# Patient Record
Sex: FEMALE | Race: WHITE
[De-identification: names, ages, dates, MRNs, and addresses within clinical notes are randomized per-mention and may not be internally consistent; named-entity substitution may affect disease eponyms.]

---

## 2017-07-24 ENCOUNTER — HOSPITAL ENCOUNTER (INPATIENT)
Dept: HOSPITAL 41 - JD.MS | Age: 77
LOS: 8 days | Discharge: HOME | DRG: 470 | End: 2017-08-01
Attending: ORTHOPAEDIC SURGERY | Admitting: ORTHOPAEDIC SURGERY
Payer: MEDICARE

## 2017-07-24 DIAGNOSIS — M32.9: ICD-10-CM

## 2017-07-24 DIAGNOSIS — D69.6: ICD-10-CM

## 2017-07-24 DIAGNOSIS — Z88.8: ICD-10-CM

## 2017-07-24 DIAGNOSIS — Z79.899: ICD-10-CM

## 2017-07-24 DIAGNOSIS — D61.818: ICD-10-CM

## 2017-07-24 DIAGNOSIS — I10: ICD-10-CM

## 2017-07-24 DIAGNOSIS — M17.12: Primary | ICD-10-CM

## 2017-07-24 DIAGNOSIS — H35.30: ICD-10-CM

## 2017-07-24 DIAGNOSIS — Z79.52: ICD-10-CM

## 2017-07-24 PROCEDURE — C1713 ANCHOR/SCREW BN/BN,TIS/BN: HCPCS

## 2017-07-24 PROCEDURE — C1776 JOINT DEVICE (IMPLANTABLE): HCPCS

## 2017-07-31 PROCEDURE — 0SRD0J9 REPLACEMENT OF LEFT KNEE JOINT WITH SYNTHETIC SUBSTITUTE, CEMENTED, OPEN APPROACH: ICD-10-PCS | Performed by: ORTHOPAEDIC SURGERY

## 2017-07-31 RX ADMIN — OXYCODONE HYDROCHLORIDE AND ACETAMINOPHEN PRN TAB: 5; 325 TABLET ORAL at 20:25

## 2017-07-31 RX ADMIN — IODINE ONE ML: 20; 20.4; 47 LIQUID TOPICAL at 08:11

## 2017-07-31 RX ADMIN — BUPIVACAINE HYDROCHLORIDE ONE: 2.5 INJECTION, SOLUTION EPIDURAL; INFILTRATION; INTRACAUDAL; PERINEURAL at 13:09

## 2017-07-31 RX ADMIN — BUPIVACAINE HYDROCHLORIDE ONE MG: 2.5 INJECTION, SOLUTION EPIDURAL; INFILTRATION; INTRACAUDAL; PERINEURAL at 07:55

## 2017-07-31 RX ADMIN — IODINE ONE ML: 20; 20.4; 47 LIQUID TOPICAL at 07:54

## 2017-07-31 RX ADMIN — BUPIVACAINE HYDROCHLORIDE ONE MG: 2.5 INJECTION, SOLUTION EPIDURAL; INFILTRATION; INTRACAUDAL; PERINEURAL at 08:19

## 2017-07-31 NOTE — PCM.CONS
H&P History of Present Illness





- General


Date of Service: 07/31/17


Admit Problem/Dx: 


 Admission Diagnosis/Problem





Admission Diagnosis/Problem      Osteoarthritis of knee








Source of Information: Patient, Family, Old Records, Provider, RN Notes Reviewed


History Limitations: Reports: Physical Impairment





- History of Present Illness


Initial Comments - Free Text/Narative: 


This is a 76-year-old, white male, with past medical history of HTN, Hx/o Left 

Hip Fracture Status Post Surgical Pinning, Chronic ETOH Use, Chronic Tobacco 

Product Use (He chews) and Osteopenia who underwent left hip hardware removal 

and left total knee arthroplasty post operative day zero. Patient is doing 

relatively well. Currently, his pain is controlled. He denies any acute issues. 

Medicine was consulted for postoperative care.   








- Related Data


Allergies/Adverse Reactions: 


 Allergies











Allergy/AdvReac Type Severity Reaction Status Date / Time


 


ACE Inhibitors Allergy  Cannot Verified 07/31/17 06:44





   Remember  


 


amoxicillin Allergy  Cannot Verified 07/31/17 06:44





   Remember  


 


Sulfa (Sulfonamide Allergy  Cannot Verified 07/31/17 06:44





Antibiotics)   Remember  











Home Medications: 


 Home Meds





Gabapentin [Neurontin] 300 mg PO TID 10/27/16 [History]


Hydroxychloroquine [Plaquenil] 200 mg PO BID 10/27/16 [History]


Losartan/Hydrochlorothiazide [Losartan-HCTZ 50-12.5 MG] 1 tab PO BID 10/27/16 [

History]


azaTHIOprine [Azathioprine] 50 mg PO BID 10/27/16 [History]


Aspirin [Halfprin] 81 mg PO DAILY 07/28/17 [History]


Betamethasone Valerate 1 applic TP ASDIRECTED PRN 07/28/17 [History]


Biotin 5,000 mcg PO DAILY 07/28/17 [History]


Cranberry 400 mg PO BID 07/28/17 [History]


Diltiazem HCl [Diltiazem ER] 300 mg PO DAILY 07/28/17 [History]


FA/Lycopene/Lut/MV,Ca,Iron,Min [Centrum] 1 tab PO DAILY 07/28/17 [History]


Lysine 500 mg PO DAILY 07/28/17 [History]


Zolpidem Tartrate [Ambien] 5 mg PO BEDTIME PRN 07/28/17 [History]


predniSONE [Prednisone] 5 mg PO DAILY 07/28/17 [History]











Past Medical History


HEENT History: Reports: Impaired Vision, Macular Degeneration, Other (See Below)


Other HEENT History: Broken tooth


Cardiovascular History: Reports: Hypertension, Other (See Below)


Other Cardiovascular History: Dependent edema


Respiratory History: Reports: None


Gastrointestinal History: Reports: Colon Polyp


Genitourinary History: Reports: UTI, Recurrent


OB/GYN History: Reports: Pregnancy


Musculoskeletal History: Reports: Osteoarthritis


Other Musculoskeletal History: Hand pain, ankle sprain, bilateral carpal tunnel


Neurological History: Reports: Other (See Below)


Other Neuro History: Mild kyphosis


Psychiatric History: Reports: None


Endocrine/Metabolic History: Reports: Osteopenia, Other (See Below)


Other Endocrine/Metabolic History: Lupus


Hematologic History: Reports: Other (See Below)


Other Hematologic History: Thrombocytopenia, pancytopenia


Immunologic History: Reports: SLE, Other (See Below)


Other Immunologic History: Lupus


Oncologic (Cancer) History: Reports: None


Dermatologic History: Reports: Other (See Below)


Other Dermatologic History: Dry skin





- Infectious Disease History


Infectious Disease History: Reports: Shingles





- Past Surgical History


HEENT Surgical History: Reports: Cataract Surgery


Cardiovascular Surgical History: Reports: None


Respiratory Surgical History: Reports: None


GI Surgical History: Reports: Colonoscopy


Female  Surgical History: Reports: Dilitation & Evacuation


Endocrine Surgical History: Reports: None


Neurological Surgical History: Reports: None


Musculoskeletal Surgical History: Reports: Knee Replacement


Other Musculoskeletal Surgeries/Procedures:: Bilateral carpaul tunnel repair, 

Right total knee replacement


Oncologic Surgical History: Reports: None


Dermatological Surgical History: Reports: Skin Biopsy





Social & Family History





- Family History


Family Medical History: Noncontributory





- Tobacco Use


Smoking Status *Q: Never Smoker


Second Hand Smoke Exposure: No





- Caffeine Use


Caffeine Use: Reports: Coffee


Caffeine Use Comment: for breakfast





- Alcohol Use


Days Per Week of Alcohol Use: 0





- Recreational Drug Use


Recreational Drug Use: No


Drug Use in Last 12 Months: No





H&P Review of Systems





- Review of Systems:


Review Of Systems: See Below


General: Denies: Fever, Chills, Malaise, Weakness, Fatigue


HEENT: Reports: No Symptoms


Pulmonary: Denies: Shortness of Breath


Cardiovascular: Denies: Chest Pain, Palpitations, Dyspnea on Exertion, Edema, 

Lightheadedness


Gastrointestinal: Denies: Abdominal Pain, Decreased Appetite, Nausea, Vomiting


Genitourinary: Reports: No Symptoms


Musculoskeletal: Reports: No Symptoms


Skin: Denies: Cyanosis, Rash, Erythema


Psychiatric: Denies: Depression, Anxiety, Agitation, Hallucinations, Suicidal 

Ideation


Neurological: Reports: Pre-Existing Deficit, Difficulty Walking, Gait 

Disturbance.  Denies: Confusion, Weakness


Hematologic/Lymphatic: Reports: No Symptoms


Immunologic: Reports: No Symptoms





Exam





- Exam


Exam: See Below





- Vital Signs


Vital Signs: 


 Last Vital Signs











Temp  36.5 C   07/31/17 11:40


 


Pulse  86   07/31/17 11:40


 


Resp  14   07/31/17 11:40


 


BP  128/69   07/31/17 11:40


 


Pulse Ox  96   07/31/17 11:59











Weight: 59.421 kg





- Exam


General: Alert, Oriented, Cooperative, Mild Distress


HEENT: Conjunctiva Clear, EACs Clear, EOMI, Hearing Intact, Mucosa Moist & Pink

, Nares Patent, Normal Nasal Septum, Posterior Pharynx Clear, Pupils Equal, 

Pupils Reactive, TMs Clear, Other (Poor Dentition)


Neck: Supple, Trachea Midline, +2 Carotid Pulse wo Bruit


Lungs: Clear to Auscultation, Normal Respiratory Effort


Cardiovascular: Regular Rate, Regular Rhythm


GI/Abdominal Exam: Normal Bowel Sounds, Soft, Non-Tender, No Organomegaly, No 

Distention, No Abnormal Bruit, No Mass


 (Female) Exam: Other (Indwelling taylor catheter)


Rectal (Female) Exam: Deferred


Back Exam: Normal Inspection, Decreased Range of Motion


Extremities: Normal Inspection, Non-Tender, No Pedal Edema, Normal Capillary 

Refill, Other (limited due to abduction wedge)


Peripheral Pulses: 2+: Posterior Tibial (L), Posterior Tibial (R), Dorsalis 

Pedis (L), Dorsalis Pedis (R)


Skin: Warm, Dry, Intact


Neuro Extensive - Mental Status: Oriented x3, Normal Cognition, Memory Intact


Neuro Extensive - Motor, Sensory, Reflexes: CN II-XII Intact (limited but 

fairly intact), Abnormal Gait


Psychiatric: Alert, Normal Affect, Normal Mood.  No: Anxious, Agitated, 

Withdrawal Symptoms





Consult PN Assessment/Plan


POD#: 0


Procedures: 


Procedures





BONE IMAGING 3 PHASE (05/08/17)


DRAIN/INJ JOINT/BURSA W/O US (12/13/16)


DXA BONE DENSITY AXIAL (06/07/17)


EMERGENCY DEPT VISIT (10/27/16)


EMERGENCY DEPT VISIT (06/10/15)


X-RAY EXAM OF ANKLE (06/10/15)


X-RAY EXAM OF LOWER LEG (06/10/15)


X-RAY EXAM OF WRIST (10/27/16)








Problem List Initiated/Reviewed/Updated: Yes


Plan: 


Assessment:


Acute:


Post-Operative Care State


   - Stable


   - Continue to monitor for hemodynamic instability





S/p Removal of Hardware and Left Total Knee Arthroplasty


   - Stable


   - DVT and Pain Management as per primary team





Hx/o Chronic Pain From Left Hip Hardware S/p Removal and TKA  


   - Pain Management as per primary team





Chronic:   


HTN


Chronic ETOH Use, drinks 4-5 beers a day


Tobacco Product Use, he chews


Osteopenia





Plan:


He is clinically stable


Routine AM labs


Monitor for Withdrawal, no need for prophylaxis at this time


Nicotine Patch 21 mg daily


Continue home meds


PT/OT consult


IS q2 awake 





Thank you for the opportunity to participate in the management of this patient.





Requesting Provider: Dr. Renae


Date Consult Requested: 07/31/17


Reason for Consult: Post-Operative Care


Patient History Reviewed: Yes


Admission H&P Reviewed: Yes


Consult Result/Summary: Stable

## 2017-07-31 NOTE — PCM.OPNOTE
- General Post-Op/Procedure Note


Date of Surgery/Procedure: 07/31/17


Operative Procedure(s): left total knee arthroplasty


Pre Op Diagnosis: left knee osteoarthrosis


Post-Op Diagnosis: Same


Anesthesia Technique: Local, MAC, Spinal


Primary Surgeon: Claude Renae


Anesthesia Provider: Cierra Nolan


Assistant: Gale Lane


Assistant: Lexie Crawford


EBL in mLs: 300


Complications: None


Condition: Good


Free Text/Narrative:: 


 Intake & Output











 07/30/17 07/31/17 07/31/17





 22:59 06:59 14:59


 


Intake Total   400


 


Output Total   200


 


Balance   200

## 2017-07-31 NOTE — PCM.PREANE
Preanesthetic Assessment





- Anesthesia/Transfusion/Family Hx


Anesthesia History: Prior Anesthesia Without Reaction


Family History of Anesthesia Reaction: No


Transfusion History: Prior Transfusion Without Reaction





- Review of Systems


General: No Symptoms


Pulmonary: No Symptoms


Cardiovascular: No Symptoms


Gastrointestinal: No Symptoms


Neurological: No Symptoms


Other: Reports: Easy Bruising





- Physical Assessment


NPO Status Date: 07/30/17


NPO Status Time: 20:15


O2 Sat by Pulse Oximetry: 96


Respiratory Rate: 16


Vital Signs: 





 Last Vital Signs











Temp  97.7 F   07/31/17 06:10


 


Pulse  96   07/31/17 06:10


 


Resp  16   07/31/17 06:10


 


BP  153/84 H  07/31/17 06:10


 


Pulse Ox  96   07/31/17 06:10











Height: 5 ft 3 in


Weight: 59.421 kg


ASA Class: 3


Mental Status: Alert & Oriented x3


Dentition: Reports: Dentures (top ), Partial (bottom)


Thyro-Mental Finger Breadths: 2


Mouth Opening Finger Breadths: 3


ROM/Head Extension: Full


Lungs: Clear to Auscultation, Normal Respiratory Effort


Cardiovascular: Regular Rate, Regular Rhythm, No Murmurs





- Lab


Values: 





 Laboratory Last Values











MRSA (PCR)  Negative   07/19/17  12:41    








7/17/17 labs





HGB 13.8 


Plt 145


BUN 17


Cr 0.60





Na 134


K 3.9


Cl 96


COs 28





- Imaging/EKG


Impressions: 





7-17-17 EKG SR rate 74 inferior infarct-old





- Allergies


Allergies/Adverse Reactions: 


 Allergies











Allergy/AdvReac Type Severity Reaction Status Date / Time


 


ACE Inhibitors Allergy  Cannot Verified 07/31/17 06:44





   Remember  


 


amoxicillin Allergy  Cannot Verified 07/31/17 06:44





   Remember  


 


Sulfa (Sulfonamide Allergy  Cannot Verified 07/31/17 06:44





Antibiotics)   Remember  














- Blood


Blood Available: No





- Acknowledgements


Anesthesia Type Planned: Spinal


Pt an Appropriate Candidate for the Planned Anesthesia: Yes


Alternatives and Risks of Anesthesia Discussed w Pt/Guardian: Yes


Pt/Guardian Understands and Agrees with Anesthesia Plan: Yes





PreAnesthesia Questionnaire


HEENT History: Reports: Impaired Vision, Macular Degeneration, Other (See Below)


Other HEENT History: Broken tooth


Cardiovascular History: Reports: Hypertension, Other (See Below)


Other Cardiovascular History: Dependent edema


Respiratory History: Reports: None


Gastrointestinal History: Reports: Colon Polyp


Genitourinary History: Reports: UTI, Recurrent


OB/GYN History: Reports: Pregnancy


Musculoskeletal History: Reports: Osteoarthritis


Other Musculoskeletal History: Hand pain, ankle sprain, bilateral carpal tunnel


Neurological History: Reports: Other (See Below)


Other Neuro History: Mild kyphosis


Psychiatric History: Reports: None


Endocrine/Metabolic History: Reports: Osteopenia, Other (See Below)


Other Endocrine/Metabolic History: Lupus


Hematologic History: Reports: Other (See Below)


Other Hematologic History: Thrombocytopenia, pancytopenia


Immunologic History: Reports: Other (See Below)


Other Immunologic History: Lupus


Oncologic (Cancer) History: Reports: None


Dermatologic History: Reports: Other (See Below)


Other Dermatologic History: Dry skin





- Infectious Disease History


Infectious Disease History: Reports: Shingles





- Past Surgical History


HEENT Surgical History: Reports: None


Cardiovascular Surgical History: Reports: None


Respiratory Surgical History: Reports: None


GI Surgical History: Reports: Colonoscopy


Female  Surgical History: Reports: Dilitation & Evacuation


Endocrine Surgical History: Reports: None


Neurological Surgical History: Reports: None


Musculoskeletal Surgical History: Reports: Knee Replacement


Other Musculoskeletal Surgeries/Procedures:: Bilateral carpaul tunnel repair, 

Right total knee replacement


Oncologic Surgical History: Reports: None


Dermatological Surgical History: Reports: None





- SUBSTANCE USE


Smoking Status *Q: Never Smoker


Tobacco Use Within Last Twelve Months: No


Second Hand Smoke Exposure: No


Days Per Week of Alcohol Use: 0


Recreational Drug Use History: No





- HOME MEDS


Home Medications: 


 Home Meds





Gabapentin [Neurontin] 300 mg PO TID 10/27/16 [History]


Hydroxychloroquine [Plaquenil] 200 mg PO BID 10/27/16 [History]


Losartan/Hydrochlorothiazide [Losartan-HCTZ 50-12.5 MG] 1 tab PO DAILY 10/27/16 

[History]


azaTHIOprine [Azathioprine] 50 mg PO BID 10/27/16 [History]


Aspirin [Halfprin] 81 mg PO DAILY 07/28/17 [History]


Betamethasone Valerate 1 applic TP ASDIRECTED PRN 07/28/17 [History]


Biotin 5,000 mcg PO DAILY 07/28/17 [History]


Cranberry 400 mg PO BID 07/28/17 [History]


Diltiazem HCl [Diltiazem ER] 300 mg PO DAILY 07/28/17 [History]


FA/Lycopene/Lut/MV,Ca,Iron,Min [Centrum] 1 tab PO DAILY 07/28/17 [History]


Lysine 500 mg PO DAILY 07/28/17 [History]


Zolpidem Tartrate [Ambien] 5 mg PO BEDTIME PRN 07/28/17 [History]


predniSONE [Prednisone] 5 mg PO DAILY 07/28/17 [History]











- CURRENT (IN HOUSE) MEDS


Current Meds: 





 Current Medications





Lactated Ringer's (Ringers, Lactated)  1,000 mls @ 125 mls/hr IV ASDIRECTED [place]


   Last Admin: 07/31/17 06:25 Dose:  125 mls/hr


Lidocaine/Sodium Bicarbonate (Buffered Lidocaine 1% In Ns 8.4%)  0.25 ml IV 

ONETIME PRN


   PRN Reason: Prior to IV Start


   Last Admin: 07/31/17 06:25 Dose:  0.25 ml


Sodium Chloride (Saline Flush)  10 ml FLUSH ASDIRECTED PRN


   PRN Reason: Keep Vein Open





Discontinued Medications





Bupivacaine HCl (Marcaine 0.25%) Confirm Administered Dose 30 ml .ROUTE .STK-

MED ONE


   Stop: 07/31/17 06:23


Cefazolin Sodium (Ancef) Confirm Administered Dose 2 gm .ROUTE .STK-MED ONE


   Stop: 07/31/17 06:20


Cefazolin Sodium (Ancef) Confirm Administered Dose 2 gm .ROUTE .STK-MED ONE


   Stop: 07/31/17 06:47


Iodine (Iodine 2% Mild Tincture) Confirm Administered Dose 30 ml .ROUTE .STK-

MED ONE


   Stop: 07/31/17 06:20


Morphine Sulfate (Duramorph Pf) Confirm Administered Dose 10 mg .ROUTE .STK-MED 

ONE


   Stop: 07/31/17 06:47


Ondansetron HCl (Zofran) Confirm Administered Dose 4 mg .ROUTE .STK-MED ONE


   Stop: 07/31/17 06:47


Propofol (Diprivan  20 Ml) Confirm Administered Dose 200 mg .ROUTE .STK-MED ONE


   Stop: 07/31/17 06:50


Sodium Chloride (Saline Flush) Confirm Administered Dose 10 ml .ROUTE .STK-MED 

ONE


   Stop: 07/31/17 06:47


Tranexamic Acid (Cyklokapron) Confirm Administered Dose 1,000 mg .ROUTE .STK-

MED ONE


   Stop: 07/31/17 06:20

## 2017-07-31 NOTE — PCM.POSTAN
POST ANESTHESIA ASSESSMENT





- MENTAL STATUS


Mental Status: Alert, Oriented





- VITAL SIGNS


Pulse Rate: 93


SaO2: 99


Resp Rate: 9


Blood Pressure: 116/63


Temperature: 98.8 F





- RESPIRATORY


Respiratory Status: Respiratory Rate WNL, Airway Patent, O2 Saturation Stable





- CARDIOVASCULAR


CV Status: Pulse Rate WNL, Blood Pressure Stable





- GASTROINTESTINAL


GI Status: No Symptoms





- PAIN


Pain Score: 0





- POST OP HYDRATION


Hydration Status: Adequate & Stable

## 2017-07-31 NOTE — CR
Left knee: AP and lateral views of the left knee were obtained.

 

Comparison: No previous knee exam.

 

Knee prosthesis is seen.  Components are well aligned.  Soft tissue 

air is noted from the surgical procedure.  Underlying bony structures 

are intact.

 

Impression:

1.  Satisfactory radiographic appearance of a recently placed left 

knee prosthesis.

 

Diagnostic code #2

## 2017-07-31 NOTE — OR
DATE OF OPERATION:  07/31/2017

 

SURGEON:  Claude Renae MD

 

OPERATION PERFORMED:  Left total knee arthroplasty.

 

PREOPERATIVE DIAGNOSIS:

Left total knee osteoarthrosis.

 

POSTOPERATIVE DIAGNOSIS:

Left total knee osteoarthrosis.

 

ANESTHESIA:

Local MAC with spinal.

 

ANESTHESIA PROVIDER:

Cierra Nolan CRNA.

 

ASSISTANT:

Gale Lane PA-C and Lexie Crawford LPN.

 

ESTIMATED BLOOD LOSS:

300 mL.

 

COMPLICATIONS:

None.

 

CONDITION:

Stable.

 

IMPLANTS:

1. [place] size 3 PS femur.

2. [place] size 3 universal tibial baseplate.

3. Kimber[employer] size 3, 11 PS X3 polyethylene.

4. [employer] 29 x 9 mm asymmetric patella.

 

DESCRIPTION OF PROCEDURE:

The patient was identified in the preop holding area.  Proper site was marked

and identified by the surgeon.  The patient was taken back to the operating

theater.  After adequate anesthesia, the patient's left lower extremity had a

nonsterile tourniquet applied and it was then sterilely prepped and draped in

the usual sterile fashion.  OR timeout was performed.  The patient received 2 g

IV Ancef.  At this time, the left lower extremity was exsanguinated.  Tourniquet

was insufflated to 300 mmHg.  Standard medial parapatellar incision was made.

Medial parapatellar arthrotomy was created.  Deep fibers of the MCL were raised

and anterior fat pad was resected.  At this time, attention was turned to the

patella.  Patella measured 22, it was resected to a 13 for a 29 x 9 mm patella.

Drill holes were then drilled and found to be in adequate position.  The drill

was then drilled in the distal femur and the intramedullary distal femoral

cutting guide was then placed.  8 mm was resected off the distal femur and was

found to be an adequate resection.  Sizing guide was placed.  It was found to be

a size 3 PS femur that was shown on the implant record at the beginning of this

dictation.  The drill holes were drilled for the epicondylar axis using

[place] line and epicondyles as reference.  At this time, the 4-in-1 cutting

block was placed.  An anterior posterior and anterior and posterior chamfer cuts

were then completed.  The correct size box cut was then placed and the box cut

was completed and found to be an adequate resection.  Attention was turned to

the tibia.  The posterior medial lateral retractors were placed.  The

extramedullary tibial guide was placed.  It was placed in the old footprint of

the ACL.  It was aligned with the center of the ankle and 0 degrees of slope, 9

mm was then resected off the unaffected lateral side.  There was found to be an

acceptable reduction.  At this time, posterior osteophytes were removed along

with medial and lateral meniscus.  A trial implant was placed with a correct

sized tibia that was mentioned at the beginning of the dictation.  An 11 mm

trial spacer was placed and an 11 mm X3 PS polyethylene was then placed.  The

patient's knee was brought through range of motion.  The patella was tracking

centrally and was stable to varus and valgus stress.  Alignment was found to be

roughly at 0 degrees.  At this time, cement was mixed on the back table.  The

tibia was stamped and drilled in proper rotation.  All cut surfaces were

irrigated with pulse lavage irrigation with Ancef and then completely dried.

Once this was completed, then the cement was ready.  The universal tibial base

plate was cemented in place.  Next, the 3 PS femur cemented into place and the

11 mm X3 PS polyethylene was placed.  The patient's knee was brought into full

extension.  Excess cement was removed.  The patella was then cemented in place

at this time.  Tourniquet was deflated.  One liter dilute Betadine solution was

irrigated through the knee along with 3 L of pulse lavage irrigation with Ancef.

Periarticular injection was then completed.  The patient's knee was brought

through a range of motion.  Once the cement had time to set up and it was found

to be stable to varus valgus stress, the patella was tracking centrally with

full range of motion.  At this time, a #2 barbed suture was used for closure of

the medial parapatellar arthrotomy.  Topical tranexamic acid was placed.  2-0

Vicryl was used subcutaneously, a running 3-0 Monocryl was used subcuticularly.

The patient tolerated the procedure well and was sent to the PACU in stable

condition.

 

DD:  07/31/2017 12:01:34

DT:  07/31/2017 12:26:18  [facility]

Job #:  [id number]

## 2017-07-31 NOTE — PCM.CONS
H&P History of Present Illness





- General


Date of Service: 07/31/17


Admit Problem/Dx: 


 Admission Diagnosis/Problem





Admission Diagnosis/Problem      Osteoarthritis of knee








Source of Information: Patient, Old Records, Provider, RN Notes Reviewed


History Limitations: Reports: Physical Impairment





- History of Present Illness


Initial Comments - Free Text/Narative: 


This is a 76-year-old, white female, with past medical history of HTN, Lupus 

Arthritis, SLE, Thrombocytopenia, Osteopenia, Macular Degeneration, and Sprain 

of Tibio-fibular Ligament of the Ankle who underwent left hip arthroplasty post 

operative day zero. Patient is doing relatively well. Currently, her pain is 

controlled. She denies any acute issues. Medicine was consulted for 

postoperative care. 








- Related Data


Allergies/Adverse Reactions: 


 Allergies











Allergy/AdvReac Type Severity Reaction Status Date / Time


 


ACE Inhibitors Allergy  Cannot Verified 07/31/17 06:44





   Remember  


 


amoxicillin Allergy  Cannot Verified 07/31/17 06:44





   Remember  


 


Sulfa (Sulfonamide Allergy  Cannot Verified 07/31/17 06:44





Antibiotics)   Remember  











Home Medications: 


 Home Meds





Gabapentin [Neurontin] 300 mg PO TID 10/27/16 [History]


Hydroxychloroquine [Plaquenil] 200 mg PO BID 10/27/16 [History]


Losartan/Hydrochlorothiazide [Losartan-HCTZ 50-12.5 MG] 1 tab PO BID 10/27/16 [

History]


azaTHIOprine [Azathioprine] 50 mg PO BID 10/27/16 [History]


Aspirin [Halfprin] 81 mg PO DAILY 07/28/17 [History]


Betamethasone Valerate 1 applic TP ASDIRECTED PRN 07/28/17 [History]


Biotin 5,000 mcg PO DAILY 07/28/17 [History]


Cranberry 400 mg PO BID 07/28/17 [History]


Diltiazem HCl [Diltiazem ER] 300 mg PO DAILY 07/28/17 [History]


FA/Lycopene/Lut/MV,Ca,Iron,Min [Centrum] 1 tab PO DAILY 07/28/17 [History]


Lysine 500 mg PO DAILY 07/28/17 [History]


Zolpidem Tartrate [Ambien] 5 mg PO BEDTIME PRN 07/28/17 [History]


predniSONE [Prednisone] 5 mg PO DAILY 07/28/17 [History]











Past Medical History


HEENT History: Reports: Impaired Vision, Macular Degeneration, Other (See Below)


Other HEENT History: Broken tooth


Cardiovascular History: Reports: Hypertension, Other (See Below)


Other Cardiovascular History: Dependent edema


Respiratory History: Reports: None


Gastrointestinal History: Reports: Colon Polyp


Genitourinary History: Reports: UTI, Recurrent


OB/GYN History: Reports: Pregnancy


Musculoskeletal History: Reports: Osteoarthritis


Other Musculoskeletal History: Hand pain, ankle sprain, bilateral carpal tunnel


Neurological History: Reports: Other (See Below)


Other Neuro History: Mild kyphosis


Psychiatric History: Reports: None


Endocrine/Metabolic History: Reports: Osteopenia, Other (See Below)


Other Endocrine/Metabolic History: Lupus


Hematologic History: Reports: Other (See Below)


Other Hematologic History: Thrombocytopenia, pancytopenia


Immunologic History: Reports: SLE, Other (See Below)


Other Immunologic History: Lupus


Oncologic (Cancer) History: Reports: None


Dermatologic History: Reports: Other (See Below)


Other Dermatologic History: Dry skin





- Infectious Disease History


Infectious Disease History: Reports: Shingles





- Past Surgical History


HEENT Surgical History: Reports: Cataract Surgery


Cardiovascular Surgical History: Reports: None


Respiratory Surgical History: Reports: None


GI Surgical History: Reports: Colonoscopy


Female  Surgical History: Reports: Dilitation & Evacuation


Endocrine Surgical History: Reports: None


Neurological Surgical History: Reports: None


Musculoskeletal Surgical History: Reports: Knee Replacement


Other Musculoskeletal Surgeries/Procedures:: Bilateral carpaul tunnel repair, 

Right total knee replacement


Oncologic Surgical History: Reports: None


Dermatological Surgical History: Reports: Skin Biopsy





Social & Family History





- Family History


Family Medical History: Noncontributory





- Tobacco Use


Smoking Status *Q: Never Smoker


Second Hand Smoke Exposure: No





- Caffeine Use


Caffeine Use: Reports: Coffee


Caffeine Use Comment: for breakfast





- Alcohol Use


Days Per Week of Alcohol Use: 0





- Recreational Drug Use


Recreational Drug Use: No


Drug Use in Last 12 Months: No





H&P Review of Systems





- Review of Systems:


Review Of Systems: See Below


General: Denies: Fever, Chills, Malaise, Weakness


HEENT: Reports: No Symptoms


Pulmonary: Denies: Shortness of Breath


Cardiovascular: Denies: Chest Pain, Palpitations, Dyspnea on Exertion, 

Lightheadedness


Gastrointestinal: Reports: Flatus.  Denies: Abdominal Pain, Decreased Appetite, 

Difficulty Swallowing, Nausea, Vomiting


Genitourinary: Reports: No Symptoms, Other


Musculoskeletal: Denies: Neck Pain


Skin: Denies: Jaundice, Erythema, Wound


Psychiatric: Denies: Confusion, Depression, Anxiety, Hallucinations, Suicidal 

Ideation


Neurological: Reports: Pre-Existing Deficit, Difficulty Walking, Gait 

Disturbance.  Denies: Confusion, Weakness


Hematologic/Lymphatic: Reports: No Symptoms


Immunologic: Reports: No Symptoms





Exam





- Exam


Exam: See Below





- Vital Signs


Vital Signs: 


 Last Vital Signs











Temp  37.0 C   07/31/17 12:52


 


Pulse  96   07/31/17 12:52


 


Resp  16   07/31/17 12:52


 


BP  126/62   07/31/17 12:52


 


Pulse Ox  96   07/31/17 12:52











Weight: 59.421 kg





- Exam


General: Alert, Oriented, Cooperative.  No: Mild Distress


HEENT: Conjunctiva Clear, EACs Clear, EOMI, Hearing Intact, Mucosa Moist & Pink

, Nares Patent, Normal Nasal Septum, Posterior Pharynx Clear, Pupils Equal, 

Pupils Reactive


Neck: Supple, Trachea Midline, +2 Carotid Pulse wo Bruit


Lungs: Clear to Auscultation, Normal Respiratory Effort


Cardiovascular: Regular Rate, Regular Rhythm


GI/Abdominal Exam: Normal Bowel Sounds, Soft, Non-Tender, No Organomegaly, No 

Distention, No Abnormal Bruit, No Mass


 (Female) Exam: Other (indwelling taylor catheter)


Rectal (Female) Exam: Deferred


Back Exam: Normal Inspection, Decreased Range of Motion


Extremities: Normal Inspection, Normal Range of Motion, Non-Tender, No Pedal 

Edema, Normal Capillary Refill


Peripheral Pulses: 2+: Posterior Tibial (L), Posterior Tibial (R), Dorsalis 

Pedis (L), Dorsalis Pedis (R)


Neuro Extensive - Mental Status: Oriented x3, Normal Cognition, Memory Intact


Neuro Extensive - Motor, Sensory, Reflexes: CN II-XII Intact (Limited but 

fairly intact), Abnormal Gait


Psychiatric: Alert, Normal Affect, Normal Mood





Consult PN Assessment/Plan


POD#: 0


Procedures: 


Procedures





BONE IMAGING 3 PHASE (05/08/17)


DRAIN/INJ JOINT/BURSA W/O US (12/13/16)


DXA BONE DENSITY AXIAL (06/07/17)


EMERGENCY DEPT VISIT (10/27/16)


EMERGENCY DEPT VISIT (06/10/15)


X-RAY EXAM OF ANKLE (06/10/15)


X-RAY EXAM OF LOWER LEG (06/10/15)


X-RAY EXAM OF WRIST (10/27/16)








Problem List Initiated/Reviewed/Updated: Yes


My Orders Last 24 Hours: 


My Active Orders





07/31/17 16:04


LORazepam [Ativan]   2 mg IVPUSH Q4H PRN 


LORazepam [Ativan]   See Protocol  IVPUSH Q4H PRN 


Metoprolol Tartrate [Lopressor]   5 mg IVPUSH Q4H PRN 


hydrALAZINE [Apresoline]   20 mg IVPUSH Q4H PRN 





08/01/17 09:00


Nicotine [Habitrol]   21 mg TRDERM DAILY 











Plan: 


Assessment:


Acute:


Post-Operative Care State


   - Stable


   - Continue to monitor for hemodynamic instability





S/p Left Total Knee Arthroplasty


   - Stable


   - DVT and Pain Management as per primary team





Hx/o Chronic Pain Left Knee S/p TKA  


   - Pain Management as per primary team





Chronic:   


HTN


Lupus Arthritis


SLE


Thrombocytopenia


Osteopenia


Macular Degeneration


Sprain of Tibio-fibular Ligament of the Ankle 





Plan:


She is clinically stable


Routine AM labs


Continue home meds


PT/OT consult


IS q2 awake 





Thank you for the opportunity to participate in the management of this patient.





Requesting Provider: Dr. Renae


Date Consult Requested: 07/31/17


Reason for Consult: Post-Operative Care


Patient History Reviewed: Yes


Admission H&P Reviewed: Yes


Consult Result/Summary: Stable

## 2017-08-01 VITALS — SYSTOLIC BLOOD PRESSURE: 140 MMHG | DIASTOLIC BLOOD PRESSURE: 66 MMHG

## 2017-08-01 RX ADMIN — OXYCODONE HYDROCHLORIDE AND ACETAMINOPHEN PRN TAB: 5; 325 TABLET ORAL at 08:40

## 2017-08-01 RX ADMIN — OXYCODONE HYDROCHLORIDE AND ACETAMINOPHEN PRN TAB: 5; 325 TABLET ORAL at 13:19

## 2017-08-01 RX ADMIN — OXYCODONE HYDROCHLORIDE AND ACETAMINOPHEN PRN TAB: 5; 325 TABLET ORAL at 04:31

## 2017-08-01 NOTE — PCM.SURGPN
- General Info


Date of Service: 08/01/17


POD#: 1


Functional Status: Reports: Pain Controlled, Tolerating Diet, Ambulating, 

Urinating, Incentive Spirometry.  Denies: New Symptoms





- Review of Systems


General: Denies: Fever, Chills


Musculoskeletal: Reports: Other (The pt reports her pain has been controlled.)





- Patient Data


Vitals - Most Recent: 


 Last Vital Signs











Temp  98.1 F   08/01/17 08:07


 


Pulse  88   08/01/17 11:37


 


Resp  14   08/01/17 11:37


 


BP  140/66   08/01/17 11:37


 


Pulse Ox  93 L  08/01/17 11:37











Weight - Most Recent: 139 lb 3.2 oz


I&O - Last 24 Hours: 


 Intake & Output











 07/31/17 08/01/17 08/01/17





 22:59 06:59 14:59


 


Intake Total 1210 450 180


 


Output Total 525  


 


Balance 685 450 180











Lab Results Last 24 Hrs: 


 Laboratory Results - last 24 hr











  08/01/17 08/01/17 Range/Units





  05:50 05:50 


 


WBC  5.36   (3.98-10.04)  K/mm3


 


RBC  3.53 L   (3.98-5.22)  M/mm3


 


Hgb  10.9 L   (11.2-15.7)  gm/L


 


Hct  33.6 L   (34.1-44.9)  %


 


MCV  95.2 H   (79.4-94.8)  fl


 


MCH  30.9   (25.6-32.2)  pg


 


MCHC  32.4   (32.2-35.5)  g/dl


 


RDW Std Deviation  53.2 H   (36.4-46.3)  fL


 


Plt Count  136 L   (182-369)  K/mm3


 


MPV  10.9   (9.4-12.3)  fl


 


Neut % (Auto)  73.8 H   (34.0-71.1)  %


 


Lymph % (Auto)  13.6 L   (19.3-51.7)  %


 


Mono % (Auto)  11.4   (4.7-12.5)  %


 


Eos % (Auto)  0.4 L   (0.7-5.8)  


 


Baso % (Auto)  0.2   (0.1-1.2)  %


 


Neut # (Auto)  3.96   (1.56-6.13)  K/mm3


 


Lymph # (Auto)  0.73 L   (1.18-3.74)  K/mm3


 


Mono # (Auto)  0.61 H   (0.24-0.36)  K/mm3


 


Eos # (Auto)  0.02 L   (0.04-0.36)  K/mm3


 


Baso # (Auto)  0.01   (0.01-0.08)  K/mm3


 


Sodium   136  (136-145)  mEq/L


 


Potassium   3.9  (3.5-5.1)  mEq/L


 


Chloride   103  ()  mEq/L


 


Carbon Dioxide   31  (21-32)  mEq/L


 


Anion Gap   5.9  (5-15)  


 


BUN   19 H  (7-18)  mg/dL


 


Creatinine   0.8  (0.55-1.02)  mg/dL


 


Est Cr Clr Drug Dosing   48.72  mL/min


 


Estimated GFR (MDRD)   > 60  (>60)  mL/min


 


BUN/Creatinine Ratio   23.8 H  (14-18)  


 


Glucose   92  ()  mg/dL


 


Calcium   8.7  (8.5-10.1)  mg/dL


 


Total Bilirubin   0.5  (0.2-1.0)  mg/dL


 


AST   29  (15-37)  U/L


 


ALT   28  (14-59)  U/L


 


Alkaline Phosphatase   38 L  ()  U/L


 


Total Protein   5.4 L  (6.4-8.2)  g/dl


 


Albumin   2.9 L  (3.4-5.0)  g/dl


 


Globulin   2.5  gm/dL


 


Albumin/Globulin Ratio   1.2  (1-2)  











Med Orders - Current: 


 Current Medications





Aspirin (Ecotrin)  325 mg PO BID [facility]


   Last Admin: 08/01/17 08:39 Dose:  325 mg


Azathioprine (Imuran)  50 mg PO BID [facility]


   Last Admin: 08/01/17 08:40 Dose:  50 mg


Betamethasone Valerate (Valisone 0.1% Lotion)  0 ml TOP ASDIRECTED PRN


   PRN Reason: Pain


Bisacodyl (Dulcolax)  5 mg PO DAILY PRN


   PRN Reason: Constipation


Diltiazem HCl (Cardizem Cd)  300 mg PO DAILY [facility]


   Last Admin: 08/01/17 08:39 Dose:  300 mg


Diphenhydramine HCl (Benadryl)  25 mg IVPUSH Q6H PRN


   PRN Reason: Itching


   Last Admin: 07/31/17 20:43 Dose:  25 mg


Docusate Sodium (Colace)  100 mg PO BID [facility]


   Last Admin: 08/01/17 08:39 Dose:  100 mg


Famotidine (Pepcid)  20 mg PO BID [facility]


   Last Admin: 08/01/17 08:39 Dose:  20 mg


Gabapentin (Neurontin)  300 mg PO TID [facility]


   Last Admin: 08/01/17 08:39 Dose:  300 mg


Hydralazine HCl (Apresoline)  10 mg IVPUSH Q4H PRN


   PRN Reason: Hypertension


Hydrochlorothiazide (Hydrochlorothiazide)  12.5 mg PO DAILY [facility]


   Last Admin: 08/01/17 08:39 Dose:  12.5 mg


Hydroxychloroquine Sulfate (Plaquenil)  200 mg PO BID [facility]


   Last Admin: 08/01/17 08:39 Dose:  200 mg


Losartan Potassium (Cozaar)  50 mg PO DAILY [facility]


   Last Admin: 08/01/17 08:38 Dose:  50 mg


Magnesium Hydroxide (Milk Of Magnesia)  30 ml PO BID PRN


   PRN Reason: Constipation


Metoprolol Tartrate (Lopressor)  5 mg IVPUSH Q4H PRN


   PRN Reason: Tachycardia


Morphine Sulfate (Morphine)  2 mg IVPUSH Q2H PRN


   PRN Reason: Breakthrough Pain


Multivitamins (Thera)  1 each PO WITHBREAKFAST [facility]


   Last Admin: 08/01/17 06:42 Dose:  1 each


Ondansetron HCl (Zofran)  4 mg IVPUSH Q6H PRN


   PRN Reason: Nausea/Vomiting


Oxycodone/Acetaminophen (Percocet 325-5 Mg)  1 - 2 tab PO Q4H PRN


   PRN Reason: Pain


   Last Admin: 08/01/17 08:40 Dose:  2 tab


Lysine 500 Mg  0 each PO DAILY [facility]


   Last Admin: 08/01/17 08:41 Dose:  Not Given


Prednisone (Prednisone)  5 mg PO DAILY [facility]


   Last Admin: 08/01/17 08:39 Dose:  5 mg


Senna (Senna)  8.6 mg PO BID PRN


   PRN Reason: Constipation


Sodium Chloride (Saline Flush)  10 ml FLUSH ASDIRECTED PRN


   PRN Reason: Keep Vein Open





Discontinued Medications





Bupivacaine HCl (Marcaine 0.25%) Confirm Administered Dose 30 ml .ROUTE .[facility]-

MED ONE


   Stop: 07/31/17 06:23


   Last Admin: 07/31/17 08:20 Dose:  30 ml


Cefazolin Sodium (Ancef) Confirm Administered Dose 2 gm .ROUTE .[facility]-MED ONE


   Stop: 07/31/17 06:20


   Last Admin: 07/31/17 08:15 Dose:  2 gm


Cefazolin Sodium (Ancef) Confirm Administered Dose 2 gm .ROUTE .ST[facility]-MED ONE


   Stop: 07/31/17 06:47


Morphine Sulfate 8 mg/Epinephrine HCl 0.3 mg/Cefuroxime Sodium 750 mg/Ketorolac 

Tromethamine 30 mg/Sodium Chloride 27.9 ml  0 mg .XX ONETIME ONE


   Stop: 07/31/17 07:31


   Last Admin: 07/31/17 13:09 Dose:  Not Given


Diphenhydramine HCl (Benadryl)  25 mg IVPUSH Q6H PRN


   PRN Reason: pruritis


   Stop: 07/31/17 10:00


Diphenhydramine HCl (Benadryl)  25 mg IVPUSH Q6H PRN


   PRN Reason: pruritis


   Stop: 07/31/17 16:00


Diphenhydramine HCl (Benadryl)  25 mg IVPUSH Q6H PRN


   PRN Reason: pruritis


Hydralazine HCl (Apresoline)  20 mg IVPUSH Q4H PRN


   PRN Reason: Hypertension


Hydrocortisone Sodium Succinate (Solu-Cortef) Confirm Administered Dose 100 mg 

.ROUTE .STK-MED ONE


   Stop: 07/31/17 07:30


Lactated Ringer's (Ringers, Lactated)  1,000 mls @ 125 mls/hr IV ASDIRECTED [facility]


   Last Admin: 07/31/17 10:06 Dose:  125 mls/hr


Cefazolin Sodium/Dextrose 2 gm (/ Premix)  50 mls @ 100 mls/hr IV Q8H [facility]


   Stop: 08/01/17 07:29


   Last Admin: 08/01/17 06:41 Dose:  100 mls/hr


Lactated Ringer's (Ringers, Lactated) Confirm Administered Dose 1,000 mls @ as 

directed .ROUTE .STK-MED ONE


   Stop: 07/31/17 07:31


Iodine (Iodine 2% Mild Tincture) Confirm Administered Dose 30 ml .ROUTE .STK-

MED ONE


   Stop: 07/31/17 06:20


   Last Admin: 07/31/17 08:11 Dose:  18 ml


Lidocaine/Sodium Bicarbonate (Buffered Lidocaine 1% In Ns 8.4%)  0.25 ml IV 

ONETIME PRN


   PRN Reason: Prior to IV Start


   Last Admin: 07/31/17 06:25 Dose:  0.25 ml


Lorazepam (Ativan)  2 mg IVPUSH Q4H PRN


   PRN Reason: Seizures


Lorazepam (Ativan)  0 mg IVPUSH Q4H PRN; Protocol


   PRN Reason: Withdrawal Symptoms


Miscellaneous Information (Remove Patch)  1 ea TRDERM DAILY [facility]


Morphine Sulfate (Duramorph Pf) Confirm Administered Dose 10 mg .ROUTE .STK-MED 

ONE


   Stop: 07/31/17 06:47


Naloxone HCl (Narcan)  0.1 mg IVPUSH Q5M PRN


   PRN Reason: Oversedation


   Stop: 07/31/17 09:16


Nicotine (Habitrol)  21 mg TRDERM DAILY [facility]


Nicotine (Habitrol)  21 mg TRDERM ONETIME ONE


   Stop: 07/31/17 16:04


   Last Admin: 07/31/17 17:03 Dose:  Not Given


Ondansetron HCl (Zofran) Confirm Administered Dose 4 mg .ROUTE .STK-MED ONE


   Stop: 07/31/17 06:47


Ondansetron HCl (Zofran)  4 mg IVPUSH ONETIME PRN


   PRN Reason: Nausea/Vomiting


   Stop: 07/31/17 10:00


Phenylephrine HCl (Abimael-Synephrine) Confirm Administered Dose 10 mg .ROUTE .STK-

MED ONE


   Stop: 07/31/17 08:39


Propofol (Diprivan  20 Ml) Confirm Administered Dose 200 mg .ROUTE .STK-MED ONE


   Stop: 07/31/17 06:50


Propofol (Diprivan  20 Ml) Confirm Administered Dose 200 mg .ROUTE .STK-MED ONE


   Stop: 07/31/17 08:00


Sodium Chloride (Saline Flush) Confirm Administered Dose 10 ml .ROUTE .STK-MED 

ONE


   Stop: 07/31/17 06:47


Tranexamic Acid (Cyklokapron) Confirm Administered Dose 1,000 mg .ROUTE .STK-

MED ONE


   Stop: 07/31/17 06:20


   Last Admin: 07/31/17 08:26 Dose:  1,000 mg











- Exam


Wound/Incisions: Dressing Dry and Intact


General: Alert, Cooperative, No Acute Distress


Lungs: Normal Respiratory Effort


Extremities: Other (NVS intact for LLE.  Taisha's negative.  Indepdent SLR left.)





- Problem List Review


Problem List Initiated/Reviewed/Updated: Yes





- My Orders


Last 24 Hours: 


 Active Orders 24 hr











 Category Date Time Status


 


 Ready for Discharge [RC] PER UNIT ROUTINE Care  08/01/17 12:33 Active


 


 Consult to Dietician [CONS] Routine Cons  08/01/17 09:30 Active


 


 Aspirin [Ecotrin] Med  08/01/17 09:00 Active





 325 mg PO BID   


 


 Diltiazem [Cardizem CD] Med  08/01/17 09:00 Active





 300 mg PO DAILY   


 


 Gabapentin [Neurontin] Med  07/31/17 15:00 Active





 300 mg PO TID   


 


 Hydrochlorothiazide Med  08/01/17 09:00 Active





 12.5 mg PO DAILY   


 


 Hydroxychloroquine [Plaquenil] Med  07/31/17 21:00 Active





 200 mg PO BID   


 


 Losartan [Cozaar] Med  08/01/17 09:00 Active





 50 mg PO DAILY   


 


 Metoprolol Tartrate [Lopressor] Med  07/31/17 16:04 Active





 5 mg IVPUSH Q4H PRN   


 


 Multivitamins,Therapeutic [Thera] Med  08/01/17 07:00 Active





 1 each PO WITHBREAKFAST   


 


 Patient's Own Medication [Ptom] Med  08/01/17 09:00 Active





 0 each PO DAILY   


 


 azaTHIOprine [Imuran] Med  07/31/17 21:00 Active





 50 mg PO BID   


 


 diphenhydrAMINE [Benadryl] Med  07/31/17 16:51 Active





 25 mg IVPUSH Q6H PRN   


 


 hydrALAZINE [Apresoline] Med  07/31/17 16:49 Active





 10 mg IVPUSH Q4H PRN   


 


 predniSONE Med  08/01/17 09:00 Active





 5 mg PO DAILY   








 Medication Orders





Aspirin (Ecotrin)  325 mg PO BID [facility]


   Last Admin: 08/01/17 08:39  Dose: 325 mg


Azathioprine (Imuran)  50 mg PO BID [facility]


   Last Admin: 08/01/17 08:40  Dose: 50 mg


   Admin: 07/31/17 20:40  Dose: 50 mg


Betamethasone Valerate (Valisone 0.1% Lotion)  0 ml TOP ASDIRECTED PRN


   PRN Reason: Pain


Bisacodyl (Dulcolax)  5 mg PO DAILY PRN


   PRN Reason: Constipation


Diltiazem HCl (Cardizem Cd)  300 mg PO DAILY [facility]


   Last Admin: 08/01/17 08:39  Dose: 300 mg


Diphenhydramine HCl (Benadryl)  25 mg IVPUSH Q6H PRN


   PRN Reason: Itching


   Last Admin: 07/31/17 20:43  Dose: 25 mg


Docusate Sodium (Colace)  100 mg PO BID [facility]


   Last Admin: 08/01/17 08:39  Dose: 100 mg


   Admin: 07/31/17 20:25  Dose: 100 mg


   Admin: 07/31/17 15:24  Dose: 100 mg


Famotidine (Pepcid)  20 mg PO BID [facility]


   Last Admin: 08/01/17 08:39  Dose: 20 mg


   Admin: 07/31/17 20:25  Dose: 20 mg


   Admin: 07/31/17 15:24 Dose:  Not Given


Gabapentin (Neurontin)  300 mg PO TID [facility]


   Last Admin: 08/01/17 08:39  Dose: 300 mg


   Admin: 07/31/17 20:25  Dose: 300 mg


   Admin: 07/31/17 15:24  Dose: 300 mg


Hydralazine HCl (Apresoline)  10 mg IVPUSH Q4H PRN


   PRN Reason: Hypertension


Hydrochlorothiazide (Hydrochlorothiazide)  12.5 mg PO DAILY [facility]


   Last Admin: 08/01/17 08:39  Dose: 12.5 mg


Hydroxychloroquine Sulfate (Plaquenil)  200 mg PO BID [facility]


   Last Admin: 08/01/17 08:39  Dose: 200 mg


   Admin: 07/31/17 20:25  Dose: 200 mg


Losartan Potassium (Cozaar)  50 mg PO DAILY [facility]


   Last Admin: 08/01/17 08:38  Dose: 50 mg


Magnesium Hydroxide (Milk Of Magnesia)  30 ml PO BID PRN


   PRN Reason: Constipation


Metoprolol Tartrate (Lopressor)  5 mg IVPUSH Q4H PRN


   PRN Reason: Tachycardia


Morphine Sulfate (Morphine)  2 mg IVPUSH Q2H PRN


   PRN Reason: Breakthrough Pain


Multivitamins (Thera)  1 each PO WITHBREAKFAST [facility]


   Last Admin: 08/01/17 06:42  Dose: 1 each


Ondansetron HCl (Zofran)  4 mg IVPUSH Q6H PRN


   PRN Reason: Nausea/Vomiting


Oxycodone/Acetaminophen (Percocet 325-5 Mg)  1 - 2 tab PO Q4H PRN


   PRN Reason: Pain


   Last Admin: 08/01/17 08:40  Dose: 2 tab


   Admin: 08/01/17 04:31  Dose: 2 tab


   Admin: 07/31/17 20:25  Dose: 2 tab


Lysine 500 Mg  0 each PO DAILY [facility]


   Last Admin: 08/01/17 08:41  Dose:  


Prednisone (Prednisone)  5 mg PO DAILY [facility]


   Last Admin: 08/01/17 08:39  Dose: 5 mg


Senna (Senna)  8.6 mg PO BID PRN


   PRN Reason: Constipation


Sodium Chloride (Saline Flush)  10 ml FLUSH ASDIRECTED PRN


   PRN Reason: Keep Vein Open











- Assessment


Assessment           (Free Text/Narrative):: 





POD#1 - left TKA





- Plan


Plan                        (Free Text/Narrative):: 





1.  ASA 325mg BID for VTE prophylaxis.  SCDs, TEDs, frequent mobility.


2.  Discharge to home today.  Inpatient therapy goals met and the pt has been 

cleared medically.


3.  Outpatient P.T.


4.  Hgb 10.9.





The pt's case was discussed with Dr. Renae today.

## 2017-08-01 NOTE — PCM.CONSN
- General Info


Date of Service: 08/01/17


Admission Dx/Problem (Free Text): 


 Admission Diagnosis/Problem





Admission Diagnosis/Problem      Osteoarthritis of knee





POD #1 Lt TKA with Dr. Renae


Pain controlled, tolerating diet with nausea. Working with therapies, doing 

well. Plans dc home with family today. 


Functional Status: Reports: Pain Controlled, Tolerating Diet, Ambulating, 

Urinating.  Denies: New Symptoms





- Review of Systems


General: Reports: No Symptoms


HEENT: Reports: No Symptoms


Pulmonary: Reports: No Symptoms


Cardiovascular: Reports: No Symptoms


Gastrointestinal: Reports: No Symptoms


Genitourinary: Reports: No Symptoms


Musculoskeletal: Reports: Leg Pain


Skin: Reports: No Symptoms


Neurological: Reports: No Symptoms


Psychiatric: Reports: No Symptoms





- Patient Data


Vitals - Most Recent: 


 Last Vital Signs











Temp  98.1 F   08/01/17 08:07


 


Pulse  101 H  08/01/17 08:07


 


Resp  12   08/01/17 08:07


 


BP  145/65 H  08/01/17 08:38


 


Pulse Ox  97   08/01/17 08:07











Weight - Most Recent: 139 lb 3.2 oz


I&O - Last 24 Hours: 


 Intake & Output











 07/31/17 08/01/17 08/01/17





 22:59 06:59 14:59


 


Intake Total 1210 450 


 


Output Total 525  


 


Balance 685 450 











Lab Results Last 24 Hours: 


 Laboratory Results - last 24 hr











  08/01/17 08/01/17 Range/Units





  05:50 05:50 


 


WBC  5.36   (3.98-10.04)  K/mm3


 


RBC  3.53 L   (3.98-5.22)  M/mm3


 


Hgb  10.9 L   (11.2-15.7)  gm/L


 


Hct  33.6 L   (34.1-44.9)  %


 


MCV  95.2 H   (79.4-94.8)  fl


 


MCH  30.9   (25.6-32.2)  pg


 


MCHC  32.4   (32.2-35.5)  g/dl


 


RDW Std Deviation  53.2 H   (36.4-46.3)  fL


 


Plt Count  136 L   (182-369)  K/mm3


 


MPV  10.9   (9.4-12.3)  fl


 


Neut % (Auto)  73.8 H   (34.0-71.1)  %


 


Lymph % (Auto)  13.6 L   (19.3-51.7)  %


 


Mono % (Auto)  11.4   (4.7-12.5)  %


 


Eos % (Auto)  0.4 L   (0.7-5.8)  


 


Baso % (Auto)  0.2   (0.1-1.2)  %


 


Neut # (Auto)  3.96   (1.56-6.13)  K/mm3


 


Lymph # (Auto)  0.73 L   (1.18-3.74)  K/mm3


 


Mono # (Auto)  0.61 H   (0.24-0.36)  K/mm3


 


Eos # (Auto)  0.02 L   (0.04-0.36)  K/mm3


 


Baso # (Auto)  0.01   (0.01-0.08)  K/mm3


 


Sodium   136  (136-145)  mEq/L


 


Potassium   3.9  (3.5-5.1)  mEq/L


 


Chloride   103  ()  mEq/L


 


Carbon Dioxide   31  (21-32)  mEq/L


 


Anion Gap   5.9  (5-15)  


 


BUN   19 H  (7-18)  mg/dL


 


Creatinine   0.8  (0.55-1.02)  mg/dL


 


Est Cr Clr Drug Dosing   48.72  mL/min


 


Estimated GFR (MDRD)   > 60  (>60)  mL/min


 


BUN/Creatinine Ratio   23.8 H  (14-18)  


 


Glucose   92  ()  mg/dL


 


Calcium   8.7  (8.5-10.1)  mg/dL


 


Total Bilirubin   0.5  (0.2-1.0)  mg/dL


 


AST   29  (15-37)  U/L


 


ALT   28  (14-59)  U/L


 


Alkaline Phosphatase   38 L  ()  U/L


 


Total Protein   5.4 L  (6.4-8.2)  g/dl


 


Albumin   2.9 L  (3.4-5.0)  g/dl


 


Globulin   2.5  gm/dL


 


Albumin/Globulin Ratio   1.2  (1-2)  











Med Orders - Current: 


 Current Medications





Aspirin (Ecotrin)  325 mg PO BID [facility]


   Last Admin: 08/01/17 08:39 Dose:  325 mg


Azathioprine (Imuran)  50 mg PO BID [facility]


   Last Admin: 08/01/17 08:40 Dose:  50 mg


Betamethasone Valerate (Valisone 0.1% Lotion)  0 ml TOP ASDIRECTED PRN


   PRN Reason: Pain


Bisacodyl (Dulcolax)  5 mg PO DAILY PRN


   PRN Reason: Constipation


Diltiazem HCl (Cardizem Cd)  300 mg PO DAILY [facility]


   Last Admin: 08/01/17 08:39 Dose:  300 mg


Diphenhydramine HCl (Benadryl)  25 mg IVPUSH Q6H PRN


   PRN Reason: Itching


   Last Admin: 07/31/17 20:43 Dose:  25 mg


Docusate Sodium (Colace)  100 mg PO BID [facility]


   Last Admin: 08/01/17 08:39 Dose:  100 mg


Famotidine (Pepcid)  20 mg PO BID [facility]


   Last Admin: 08/01/17 08:39 Dose:  20 mg


Gabapentin (Neurontin)  300 mg PO TID [facility]


   Last Admin: 08/01/17 08:39 Dose:  300 mg


Hydralazine HCl (Apresoline)  10 mg IVPUSH Q4H PRN


   PRN Reason: Hypertension


Hydrochlorothiazide (Hydrochlorothiazide)  12.5 mg PO DAILY [facility]


   Last Admin: 08/01/17 08:39 Dose:  12.5 mg


Hydroxychloroquine Sulfate (Plaquenil)  200 mg PO BID [facility]


   Last Admin: 08/01/17 08:39 Dose:  200 mg


Losartan Potassium (Cozaar)  50 mg PO DAILY [facility]


   Last Admin: 08/01/17 08:38 Dose:  50 mg


Magnesium Hydroxide (Milk Of Magnesia)  30 ml PO BID PRN


   PRN Reason: Constipation


Metoprolol Tartrate (Lopressor)  5 mg IVPUSH Q4H PRN


   PRN Reason: Tachycardia


Morphine Sulfate (Morphine)  2 mg IVPUSH Q2H PRN


   PRN Reason: Breakthrough Pain


Multivitamins (Thera)  1 each PO WITHBREAKFAST [facility]


   Last Admin: 08/01/17 06:42 Dose:  1 each


Ondansetron HCl (Zofran)  4 mg IVPUSH Q6H PRN


   PRN Reason: Nausea/Vomiting


Oxycodone/Acetaminophen (Percocet 325-5 Mg)  1 - 2 tab PO Q4H PRN


   PRN Reason: Pain


   Last Admin: 08/01/17 08:40 Dose:  2 tab


Lysine 500 Mg  0 each PO DAILY [facility]


   Last Admin: 08/01/17 08:41 Dose:  Not Given


Prednisone (Prednisone)  5 mg PO DAILY [facility]


   Last Admin: 08/01/17 08:39 Dose:  5 mg


Senna (Senna)  8.6 mg PO BID PRN


   PRN Reason: Constipation


Sodium Chloride (Saline Flush)  10 ml FLUSH ASDIRECTED PRN


   PRN Reason: Keep Vein Open





Discontinued Medications





Bupivacaine HCl (Marcaine 0.25%) Confirm Administered Dose 30 ml .ROUTE .STK-

MED ONE


   Stop: 07/31/17 06:23


   Last Admin: 07/31/17 08:20 Dose:  30 ml


Cefazolin Sodium (Ancef) Confirm Administered Dose 2 gm .ROUTE .STK-MED ONE


   Stop: 07/31/17 06:20


   Last Admin: 07/31/17 08:15 Dose:  2 gm


Cefazolin Sodium (Ancef) Confirm Administered Dose 2 gm .ROUTE .STK-MED ONE


   Stop: 07/31/17 06:47


Morphine Sulfate 8 mg/Epinephrine HCl 0.3 mg/Cefuroxime Sodium 750 mg/Ketorolac 

Tromethamine 30 mg/Sodium Chloride 27.9 ml  0 mg .XX ONETIME ONE


   Stop: 07/31/17 07:31


   Last Admin: 07/31/17 13:09 Dose:  Not Given


Diphenhydramine HCl (Benadryl)  25 mg IVPUSH Q6H PRN


   PRN Reason: pruritis


   Stop: 07/31/17 10:00


Diphenhydramine HCl (Benadryl)  25 mg IVPUSH Q6H PRN


   PRN Reason: pruritis


   Stop: 07/31/17 16:00


Diphenhydramine HCl (Benadryl)  25 mg IVPUSH Q6H PRN


   PRN Reason: pruritis


Hydralazine HCl (Apresoline)  20 mg IVPUSH Q4H PRN


   PRN Reason: Hypertension


Hydrocortisone Sodium Succinate (Solu-Cortef) Confirm Administered Dose 100 mg 

.ROUTE .STK-MED ONE


   Stop: 07/31/17 07:30


Lactated Ringer's (Ringers, Lactated)  1,000 mls @ 125 mls/hr IV ASDIRECTED [facility]


   Last Admin: 07/31/17 10:06 Dose:  125 mls/hr


Cefazolin Sodium/Dextrose 2 gm (/ Premix)  50 mls @ 100 mls/hr IV Q8H [facility]


   Stop: 08/01/17 07:29


   Last Admin: 08/01/17 06:41 Dose:  100 mls/hr


Lactated Ringer's (Ringers, Lactated) Confirm Administered Dose 1,000 mls @ as 

directed .ROUTE .STK-MED ONE


   Stop: 07/31/17 07:31


Iodine (Iodine 2% Mild Tincture) Confirm Administered Dose 30 ml .ROUTE .STK-

MED ONE


   Stop: 07/31/17 06:20


   Last Admin: 07/31/17 08:11 Dose:  18 ml


Lidocaine/Sodium Bicarbonate (Buffered Lidocaine 1% In Ns 8.4%)  0.25 ml IV 

ONETIME PRN


   PRN Reason: Prior to IV Start


   Last Admin: 07/31/17 06:25 Dose:  0.25 ml


Lorazepam (Ativan)  2 mg IVPUSH Q4H PRN


   PRN Reason: Seizures


Lorazepam (Ativan)  0 mg IVPUSH Q4H PRN; Protocol


   PRN Reason: Withdrawal Symptoms


Miscellaneous Information (Remove Patch)  1 ea TRDERM DAILY [facility]


Morphine Sulfate (Duramorph Pf) Confirm Administered Dose 10 mg .ROUTE .STK-MED 

ONE


   Stop: 07/31/17 06:47


Naloxone HCl (Narcan)  0.1 mg IVPUSH Q5M PRN


   PRN Reason: Oversedation


   Stop: 07/31/17 09:16


Nicotine (Habitrol)  21 mg TRDERM DAILY [facility]


Nicotine (Habitrol)  21 mg TRDERM ONETIME ONE


   Stop: 07/31/17 16:04


   Last Admin: 07/31/17 17:03 Dose:  Not Given


Ondansetron HCl (Zofran) Confirm Administered Dose 4 mg .ROUTE .STK-MED ONE


   Stop: 07/31/17 06:47


Ondansetron HCl (Zofran)  4 mg IVPUSH ONETIME PRN


   PRN Reason: Nausea/Vomiting


   Stop: 07/31/17 10:00


Phenylephrine HCl (Abimael-Synephrine) Confirm Administered Dose 10 mg .ROUTE .STK-

MED ONE


   Stop: 07/31/17 08:39


Propofol (Diprivan  20 Ml) Confirm Administered Dose 200 mg .ROUTE .STK-MED ONE


   Stop: 07/31/17 06:50


Propofol (Diprivan  20 Ml) Confirm Administered Dose 200 mg .ROUTE .STK-MED ONE


   Stop: 07/31/17 08:00


Sodium Chloride (Saline Flush) Confirm Administered Dose 10 ml .ROUTE .STK-MED 

ONE


   Stop: 07/31/17 06:47


Tranexamic Acid (Cyklokapron) Confirm Administered Dose 1,000 mg .ROUTE .STK-

MED ONE


   Stop: 07/31/17 06:20


   Last Admin: 07/31/17 08:26 Dose:  1,000 mg











- Exam


Quality Assessment: DVT Prophylaxis


General: Alert, Oriented, Cooperative, No Acute Distress


HEENT: Pupils Equal, Pupils Reactive, EOMI, Mucous Membr. Moist/Pink


Neck: Supple


Lungs: Clear to Auscultation, Normal Respiratory Effort, Decreased Breath 

Sounds (bases)


Cardiovascular: Regular Rate, Regular Rhythm


GI/Abdominal Exam: Normal Bowel Sounds, Soft, Non-Tender, No Organomegaly


 (Female) Exam: Deferred


Extremities: Other (scd's, teds, ice to lt knee, dressing CDI to lt knee; CMS + 

to LE)


Peripheral Pulses: 1+: Dorsalis Pedis (L), Dorsalis Pedis (R)


Skin: Warm


Wound/Incisions: Dressing Dry and Intact


Neurological: No New Focal Deficit


Psy/Mental Status: Alert, Normal Affect, Normal Mood





Consult PN Assessment/Plan


POD#: 1


Procedures: 


Procedures





BONE IMAGING 3 PHASE (05/08/17)


DRAIN/INJ JOINT/BURSA W/O US (12/13/16)


DXA BONE DENSITY AXIAL (06/07/17)


EMERGENCY DEPT VISIT (10/27/16)


EMERGENCY DEPT VISIT (06/10/15)


X-RAY EXAM OF ANKLE (06/10/15)


X-RAY EXAM OF LOWER LEG (06/10/15)


X-RAY EXAM OF WRIST (10/27/16)








(1) S/P total knee arthroplasty


SNOMED Code(s): 2404112625056, 792373995, 1817826461698


   Code(s): Z96.659 - PRESENCE OF UNSPECIFIED ARTIFICIAL KNEE JOINT   Priority: 

High   Current Visit: Yes   


Qualifiers: 


   Laterality: left   Qualified Code(s): Z96.652 - Presence of left artificial 

knee joint   





(2) Osteoarthritis


SNOMED Code(s): 703248895


   Code(s): M19.90 - UNSPECIFIED OSTEOARTHRITIS, UNSPECIFIED SITE   Priority: 

High   Current Visit: Yes   


Qualifiers: 


   Osteoarthritis location: knee   Osteoarthritis type: primary   Laterality: 

left   Qualified Code(s): M17.12 - Unilateral primary osteoarthritis, left knee

   





(3) Lupus


SNOMED Code(s): 17333605, 758667797


   Code(s): M32.9 - SYSTEMIC LUPUS ERYTHEMATOSUS, UNSPECIFIED   Priority: 

Medium   Current Visit: No   


Qualifiers: 


   Systemic lupus erythematosus type: unspecified   Systemic lupus 

erythematosus organ involvement: unspecified   Qualified Code(s): M32.9 - 

Systemic lupus erythematosus, unspecified   





(4) HTN (hypertension)


SNOMED Code(s): 24433993


   Code(s): I10 - ESSENTIAL (PRIMARY) HYPERTENSION   Priority: Medium   Current 

Visit: No   


Qualifiers: 


   Hypertension type: essential hypertension   Qualified Code(s): I10 - 

Essential (primary) hypertension   





(5) Pancytopenia


SNOMED Code(s): 609282127


   Code(s): D61.818 - OTHER PANCYTOPENIA   Priority: Medium   Current Visit: No

   





(6) Osteopenia


SNOMED Code(s): 977870960


   Code(s): M85.80 - OTH DISRD OF BONE DENSITY AND STRUCTURE, UNSPECIFIED SITE 

  Priority: Medium   Current Visit: No   


Qualifiers: 


   Osteopenia location: unspecified   Qualified Code(s): M85.80 - Other 

specified disorders of bone density and structure, unspecified site   





(7) Macular degeneration


SNOMED Code(s): 436071811


   Code(s): H35.30 - UNSPECIFIED MACULAR DEGENERATION   Priority: Medium   

Current Visit: No   


Problem List Initiated/Reviewed/Updated: Yes


My Orders Last 24 Hours: 


My Active Orders





08/01/17 09:30


Consult to Dietician [CONS] Routine 











Plan: 





POD #1- Lt TKA, Dr. Renae


   -Pain management and DVT prohylax per Primary Team- Ortho


   -PT/OT


   -IS/RT


   -Hgb stable at 10.9





   -Low protein levels on labs today- dietician consult if not already done 

preoperatively. 





Chronic conditions


-Lupus- resume home meds today; plaquenil and prednisone


-HTN- resume home meds- stable


-Pancytopenia- stable at 136 today





Other:


GI prophylax


CM/SW for assist with DC planning- plans dc home today with family: OK for DC 

home today from Hospitalist standoint. 





Patient is Full Code Status.

## 2017-08-02 NOTE — PCM.DCSUM1
**Discharge Summary





- Hospital Course


Brief History: Saray is a 77 yo female who underwent left TKA with Dr. Renae 

on 7-.  The procedure was completed under spinal anesthesia.  The pt 

tolerated the procedure well and was admitted to the Medical-Surgical Unit.  

Medical management was provided by the Hospitalist service.  The pt's Hospital 

course was uneventful.  The pt's Hgb on POD#1 was 10.9.  On POD#1, 325mg BID 

was initiated for VTE prophylaxis.  SCDs and TEDs were also ordered.  A Mepilex 

dressing was placed at the incision site at the time of surgery and remained 

clean and dry.  The pt participated in P.T. and O.T. and progressed well.  The 

pt was allowed to WBAT.  On POD#1, the pt was deemed appropriate to discharge 

to home with her family.





- Discharge Data


Discharge Date: 08/01/17


Discharge Disposition: Home, Self-Care 01


Condition: Good





- Patient Summary/Data


Operative Procedure(s) Performed: left total knee arthroplasty


Consults: 


 Consultations





07/31/17 07:08


Consult to Case Management [CONS] Routine 


Consult to Physician [CONS] Routine 


OT Evaluation and Treatment [CONS] Routine 





07/31/17 07:13


PT Evaluation and Treatment [CONS] Routine 





08/01/17 09:30


Consult to Dietician [CONS] Routine 














- Patient Instructions


Diet: Usual Diet as Tolerated


Activity: Apply Ice, As Tolerated, Elevate Extremity, Full Weight Bearing


Driving: Do Not Drive


Showering/Bathing: May Shower


Wound/Incision Care: Keep Operative Site/Wound Site Clean and Dry, Do NOT 

Change Dressing


Notify Provider of: Fever, Increased Pain, Swelling and Redness, Drainage, 

Nausea and/or Vomiting


Other/Special Instructions: Please get up and moving around every hour while 

awake.  This helps to prevent blood clots.  Please use your walker and have 

help with mobility as needed.  Please take a 325mg ASPIRIN TWICE DAILY.  This 

also helps to prevent blood clots.  You may schedule for P.T.  Use the pain 

medication as needed.  The medication may cause drowsiness and constipation.  

Contact your primary care provider for instructions if you are constipated.  

You may use a stool softener like docusate sodium or Colace 100mg twice daily 

and/or a laxative like Miralax daily for constipation.  Wear the MAYANK hose 

during the day and you may remove these at night.  Place ice to the knee often.

  Place a towel between your skin and the blue pad.  Schedule an appointment 

with your primary care provider for 'routine post-op care'.  Call the Clinic 

with questions or concerns - [phone].





- Discharge Plan


Prescriptions/Med Rec: 


Acetaminophen/oxyCODONE [Percocet 325-5 MG] 1 - 2 tab PO Q4H PRN #60 tablet


 PRN Reason: Pain


Aspirin [Ecotrin] 325 mg PO BID #84 tab.ec


Home Medications: 


 Home Meds





Gabapentin [Neurontin] 300 mg PO TID 10/27/16 [History]


Hydroxychloroquine [Plaquenil] 200 mg PO BID 10/27/16 [History]


Losartan/Hydrochlorothiazide [Losartan-HCTZ 50-12.5 MG] 1 tab PO BID 10/27/16 [

History]


azaTHIOprine [Azathioprine] 50 mg PO BID 10/27/16 [History]


Aspirin [Halfprin] 81 mg PO DAILY 07/28/17 [History]


Betamethasone Valerate 1 applic TP ASDIRECTED PRN 07/28/17 [History]


Biotin 5,000 mcg PO DAILY 07/28/17 [History]


Cranberry 400 mg PO BID 07/28/17 [History]


Diltiazem HCl [Diltiazem ER] 300 mg PO DAILY 07/28/17 [History]


FA/Lycopene/Lut/MV,Ca,Iron,Min [Centrum] 1 tab PO DAILY 07/28/17 [History]


Lysine 500 mg PO DAILY 07/28/17 [History]


Zolpidem Tartrate [Ambien] 5 mg PO BEDTIME PRN 07/28/17 [History]


predniSONE [Prednisone] 5 mg PO DAILY 07/28/17 [History]


Acetaminophen/oxyCODONE [Percocet 325-5 MG] 1 - 2 tab PO Q4H PRN #60 tablet 08/ 01/17 [Rx]


Aspirin [Ecotrin] 325 mg PO BID #84 tab.ec 08/01/17 [Rx]








Patient Handouts:  Total Knee Replacement, Care After, Easy-to-Read, Total Knee 

Replacement, Easy-to-Read, Aspirin, ASA oral tablets


Referrals: 


Lyndsey Ding MD [Primary Care Provider] - 08/10/17 2:00 pm (Please 

follow-up with your primary care doctor, Lyndsey Ding, for a post-surgical 

follow-up appointment on Thursday, August 10th at 200pm. If this time does not 

work for you, please call and reschedule. )


Gale Lane PA-C [Physician Assistant] - 08/08/17 1:15 pm (Please follow-up 

with your previously scheduled appointments. The first one is with Dr. Renae on 

Tuesday, August 8th at 1:15pm and the next scheduled appointment is on Tuesday, August 15th at 11:15am. )





- Patient Data


Vitals - Most Recent: 


 Last Vital Signs











Temp  98.1 F   08/01/17 08:07


 


Pulse  88   08/01/17 11:37


 


Resp  14   08/01/17 11:37


 


BP  140/66   08/01/17 11:37


 


Pulse Ox  93 L  08/01/17 11:37











Weight - Most Recent: 139 lb 3.2 oz


Med Orders - Current: 


 Current Medications








Discontinued Medications





Aspirin (Ecotrin)  [facility] mg [facility] BID [facility]


   Last Admin: 08/01/17 08:39 Dose:  325 mg


Azathioprine (Imuran)  50 mg PO BID [facility]


   Last Admin: 08/01/17 08:40 Dose:  50 mg


Betamethasone Valerate (Valisone 0.1% Lotion)  0 ml TOP ASDIRECTED PRN


   PRN Reason: Pain


Bisacodyl (Dulcolax)  5 mg PO DAILY PRN


   PRN Reason: Constipation


Bupivacaine HCl (Marcaine 0.25%) Confirm Administered Dose 30 ml .ROUTE .STK-

MED ONE


   Stop: 07/31/17 06:23


   Last Admin: 07/31/17 08:20 Dose:  30 ml


Cefazolin Sodium (Ancef) Confirm Administered Dose 2 gm .ROUTE .STK-MED ONE


   Stop: 07/31/17 06:20


   Last Admin: 07/31/17 08:15 Dose:  2 gm


Cefazolin Sodium (Ancef) Confirm Administered Dose 2 gm .ROUTE .STK-MED ONE


   Stop: 07/31/17 06:47


Morphine Sulfate 8 mg/Epinephrine HCl 0.3 mg/Cefuroxime Sodium 750 mg/Ketorolac 

Tromethamine 30 mg/Sodium Chloride 27.9 ml  0 mg .XX ONETIME ONE


   Stop: 07/31/17 07:31


   Last Admin: 07/31/17 13:09 Dose:  Not Given


Diltiazem HCl (Cardizem Cd)  300 mg PO DAILY [facility]


   Last Admin: 08/01/17 08:39 Dose:  300 mg


Diphenhydramine HCl (Benadryl)  25 mg IVPUSH Q6H PRN


   PRN Reason: pruritis


   Stop: 07/31/17 10:00


Diphenhydramine HCl (Benadryl)  25 mg IVPUSH Q6H PRN


   PRN Reason: pruritis


   Stop: 07/31/17 16:00


Diphenhydramine HCl (Benadryl)  25 mg IVPUSH Q6H PRN


   PRN Reason: pruritis


Diphenhydramine HCl (Benadryl)  25 mg IVPUSH Q6H PRN


   PRN Reason: Itching


   Last Admin: 07/31/17 20:43 Dose:  25 mg


Docusate Sodium (Colace)  100 mg PO BID [facility]


   Last Admin: 08/01/17 08:39 Dose:  100 mg


Famotidine (Pepcid)  20 mg PO BID [facility]


   Last Admin: 08/01/17 08:39 Dose:  20 mg


Gabapentin (Neurontin)  300 mg PO TID [facility]


   Last Admin: 08/01/17 08:39 Dose:  300 mg


Hydralazine HCl (Apresoline)  20 mg IVPUSH Q4H PRN


   PRN Reason: Hypertension


Hydralazine HCl (Apresoline)  10 mg IVPUSH Q4H PRN


   PRN Reason: Hypertension


Hydrochlorothiazide (Hydrochlorothiazide)  12.5 mg PO DAILY [facility]


   Last Admin: 08/01/17 08:39 Dose:  12.5 mg


Hydrocortisone Sodium Succinate (Solu-Cortef) Confirm Administered Dose 100 mg 

.ROUTE .STK-MED ONE


   Stop: 07/31/17 07:30


Hydroxychloroquine Sulfate (Plaquenil)  200 mg PO BID [facility]


   Last Admin: 08/01/17 08:39 Dose:  200 mg


Lactated Ringer's (Ringers, Lactated)  1,000 mls @ 125 mls/hr IV ASDIRECTED [facility]


   Last Admin: 07/31/17 10:06 Dose:  125 mls/hr


Cefazolin Sodium/Dextrose 2 gm (/ Premix)  50 mls @ 100 mls/hr IV Q8H [facility]


   Stop: 08/01/17 07:29


   Last Admin: 08/01/17 06:41 Dose:  100 mls/hr


Lactated Ringer's (Ringers, Lactated) Confirm Administered Dose 1,000 mls @ as 

directed .ROUTE .STK-MED ONE


   Stop: 07/31/17 07:31


Iodine (Iodine 2% Mild Tincture) Confirm Administered Dose 30 ml .ROUTE .STK-

MED ONE


   Stop: 07/31/17 06:20


   Last Admin: 07/31/17 08:11 Dose:  18 ml


Lidocaine/Sodium Bicarbonate (Buffered Lidocaine 1% In Ns 8.4%)  0.25 ml IV 

ONETIME PRN


   PRN Reason: Prior to IV Start


   Last Admin: 07/31/17 06:25 Dose:  0.25 ml


Lorazepam (Ativan)  2 mg IVPUSH Q4H PRN


   PRN Reason: Seizures


Lorazepam (Ativan)  0 mg IVPUSH Q4H PRN; Protocol


   PRN Reason: Withdrawal Symptoms


Losartan Potassium (Cozaar)  50 mg PO DAILY [facility]


   Last Admin: 08/01/17 08:38 Dose:  50 mg


Magnesium Hydroxide (Milk Of Magnesia)  30 ml PO BID PRN


   PRN Reason: Constipation


Metoprolol Tartrate (Lopressor)  5 mg IVPUSH Q4H PRN


   PRN Reason: Tachycardia


Miscellaneous Information (Remove Patch)  1 ea TRDERM DAILY [facility]


Morphine Sulfate (Duramorph Pf) Confirm Administered Dose 10 mg .ROUTE .STK-MED 

ONE


   Stop: 07/31/17 06:47


Morphine Sulfate (Morphine)  2 mg IVPUSH Q2H PRN


   PRN Reason: Breakthrough Pain


Multivitamins (Thera)  1 each PO WITHBREAKFAST [facility]


   Last Admin: 08/01/17 06:42 Dose:  1 each


Naloxone HCl (Narcan)  0.1 mg IVPUSH Q5M PRN


   PRN Reason: Oversedation


   Stop: 07/31/17 09:16


Nicotine (Habitrol)  21 mg TRDERM DAILY [facility]


Nicotine (Habitrol)  21 mg TRDERM ONETIME ONE


   Stop: 07/31/17 16:04


   Last Admin: 07/31/17 17:03 Dose:  Not Given


Ondansetron HCl (Zofran) Confirm Administered Dose 4 mg .ROUTE .STK-MED ONE


   Stop: 07/31/17 06:47


Ondansetron HCl (Zofran)  4 mg IVPUSH Q6H PRN


   PRN Reason: Nausea/Vomiting


Ondansetron HCl (Zofran)  4 mg IVPUSH ONETIME PRN


   PRN Reason: Nausea/Vomiting


   Stop: 07/31/17 10:00


Oxycodone/Acetaminophen (Percocet 325-5 Mg)  1 - 2 tab PO Q4H PRN


   PRN Reason: Pain


   Last Admin: 08/01/17 13:19 Dose:  2 tab


Lysine 500 Mg  0 each PO DAILY [facility]


   Last Admin: 08/01/17 08:41 Dose:  Not Given


Phenylephrine HCl (Abimael-Synephrine) Confirm Administered Dose 10 mg .ROUTE .STK-

MED ONE


   Stop: 07/31/17 08:39


Prednisone (Prednisone)  5 mg PO DAILY [facility]


   Last Admin: 08/01/17 08:39 Dose:  5 mg


Propofol (Diprivan  20 Ml) Confirm Administered Dose 200 mg .ROUTE .STK-MED ONE


   Stop: 07/31/17 06:50


Propofol (Diprivan  20 Ml) Confirm Administered Dose 200 mg .ROUTE .STK-MED ONE


   Stop: 07/31/17 08:00


Senna (Senna)  8.6 mg PO BID PRN


   PRN Reason: Constipation


Sodium Chloride (Saline Flush)  10 ml FLUSH ASDIRECTED PRN


   PRN Reason: Keep Vein Open


Sodium Chloride (Saline Flush) Confirm Administered Dose 10 ml .ROUTE .STK-MED 

ONE


   Stop: 07/31/17 06:47


Tranexamic Acid (Cyklokapron) Confirm Administered Dose 1,000 mg .ROUTE .STK-

MED ONE


   Stop: 07/31/17 06:20


   Last Admin: 07/31/17 08:26 Dose:  1,000 mg











*Q Meaningful Use (DIS)





- VTE *Q


VTE Criteria *Q: 








- Stroke *Q


Stroke Criteria *Q: 








- AMI *Q


AMI Criteria *Q:

## 2019-02-18 ENCOUNTER — HOSPITAL ENCOUNTER (OUTPATIENT)
Dept: HOSPITAL 41 - JD.SDS | Age: 79
Discharge: HOME | End: 2019-02-18
Attending: ORTHOPAEDIC SURGERY
Payer: MEDICARE

## 2019-02-18 VITALS — SYSTOLIC BLOOD PRESSURE: 121 MMHG | DIASTOLIC BLOOD PRESSURE: 70 MMHG

## 2019-02-18 DIAGNOSIS — D69.6: ICD-10-CM

## 2019-02-18 DIAGNOSIS — Z88.2: ICD-10-CM

## 2019-02-18 DIAGNOSIS — I10: ICD-10-CM

## 2019-02-18 DIAGNOSIS — Z88.8: ICD-10-CM

## 2019-02-18 DIAGNOSIS — Z88.0: ICD-10-CM

## 2019-02-18 DIAGNOSIS — Z79.899: ICD-10-CM

## 2019-02-18 DIAGNOSIS — M75.102: Primary | ICD-10-CM

## 2019-02-18 DIAGNOSIS — M94.212: ICD-10-CM

## 2019-02-18 DIAGNOSIS — Z79.82: ICD-10-CM

## 2019-02-18 DIAGNOSIS — M32.9: ICD-10-CM

## 2019-02-18 PROCEDURE — 29827 SHO ARTHRS SRG RT8TR CUF RPR: CPT

## 2019-02-18 PROCEDURE — 64415 NJX AA&/STRD BRCH PLXS IMG: CPT

## 2019-02-18 PROCEDURE — 29823 SHO ARTHRS SRG XTNSV DBRDMT: CPT

## 2019-02-18 NOTE — PCM.POSTAN
POST ANESTHESIA ASSESSMENT





- MENTAL STATUS


Mental Status: Alert, Oriented





- VITAL SIGNS


Pulse Rate: 110


SaO2: 100


Resp Rate: 16


Blood Pressure: 120/75


Temperature: 97.6 F





- RESPIRATORY


Respiratory Status: Respiratory Rate WNL, Airway Patent, O2 Saturation Stable, 

Supplemental Oxygen





- CARDIOVASCULAR


CV Status: Pulse Rate WNL, Blood Pressure Stable





- GASTROINTESTINAL


GI Status: No Symptoms





- PAIN


Pain Score: 0





- POST OP HYDRATION


Hydration Status: Adequate & Stable

## 2019-02-18 NOTE — [id number]
Post Anesthesia Note





- EVALUATION WITHIN 48HRS OF ANESTHETIC


Vital Signs in Normal Range: Yes


Patient Participated in Evaluation: Yes


Respiratory Function Stable: Yes


Airway Patent: Yes


Cardiovascular Function Stable: Yes


Hydration Status Stable: Yes


Pain Control Satisfactory: Yes


Nausea and Vomiting Control Satisfactory: Yes


Mental Status Recovered: Yes (complains of dry throat. No shoulder pain.)


SaO2: 93


Resp Rate: 18

## 2019-02-18 NOTE — PCM.PREANE
Preanesthetic Assessment





- Procedure


Proposed Procedure: 





left shoulder video arthroscopy with rotator cuff repair





- Anesthesia/Transfusion/Family Hx


Anesthesia History: Prior Anesthesia Without Reaction


Family History of Anesthesia Reaction: No


Transfusion History: Prior Transfusion Without Reaction





- Review of Systems


General: No Symptoms


Pulmonary: No Symptoms


Cardiovascular: No Symptoms


Gastrointestinal: No Symptoms


Neurological: No Symptoms


Other: Reports: Easy Bruising





- Physical Assessment


NPO Status Date: 02/17/19


NPO Status Time: 17:00


O2 Sat by Pulse Oximetry: 98


Respiratory Rate: 12


Vital Signs: 





 Last Vital Signs











Temp  98.3 F   02/18/19 06:30


 


Pulse  88   02/18/19 06:30


 


Resp  12   02/18/19 06:30


 


BP  147/78 H  02/18/19 06:30


 


Pulse Ox  98   02/18/19 06:30











Height: 5 ft 3 in


Weight: 59.693 kg


ASA Class: 3


Mental Status: Alert & Oriented x3


Dentition: Reports: Dentures (top), Partial (bottom)


Thyro-Mental Finger Breadths: 3


Mouth Opening Finger Breadths: 3


ROM/Head Extension: Full


Lungs: Clear to Auscultation, Normal Respiratory Effort


Cardiovascular: Regular Rate, Regular Rhythm





- Allergies


Allergies/Adverse Reactions: 


 Allergies











Allergy/AdvReac Type Severity Reaction Status Date / Time


 


ACE Inhibitors Allergy  Cannot Verified 02/15/19 12:55





   Remember  


 


amoxicillin Allergy  Cannot Verified 02/15/19 12:55





   Remember  


 


Sulfa (Sulfonamide Allergy  Cannot Verified 02/15/19 12:55





Antibiotics)   Remember  














- Blood


Blood Available: No





- Acknowledgements


Anesthesia Type Planned: General Anesthesia


Pt an Appropriate Candidate for the Planned Anesthesia: Yes


Alternatives and Risks of Anesthesia Discussed w Pt/Guardian: Yes


Pt/Guardian Understands and Agrees with Anesthesia Plan: Yes





PreAnesthesia Questionnaire


HEENT History: Reports: Impaired Vision, Macular Degeneration, Other (See Below)


Other HEENT History: has glasses, dentures


Cardiovascular History: Reports: Hypertension


Other Cardiovascular History: Dependent edema


Respiratory History: Reports: None


Gastrointestinal History: Reports: Colon Polyp


Genitourinary History: Reports: None


OB/GYN History: Reports: None


Musculoskeletal History: Reports: Osteoarthritis, Other (See Below)


Other Musculoskeletal History: left shoulder pain


Neurological History: Reports: None


Other Neuro History: Mild kyphosis


Psychiatric History: Reports: None


Endocrine/Metabolic History: Reports: Osteopenia


Other Endocrine/Metabolic History: Lupus


Hematologic History: Reports: Other (See Below)


Other Hematologic History: thrombocytopenia, pancytopenia


Immunologic History: Reports: SLE


Other Immunologic History: Lupus


Oncologic (Cancer) History: Reports: None


Dermatologic History: Reports: None


Other Dermatologic History: Dry skin





- Infectious Disease History


Infectious Disease History: Reports: Shingles





- Past Surgical History


Cardiovascular Surgical History: Reports: None


Respiratory Surgical History: Reports: None


GI Surgical History: Reports: Colonoscopy


Female  Surgical History: Reports: D&C


Male  Surgical History: Reports: None


Endocrine Surgical History: Reports: None


Neurological Surgical History: Reports: None


Musculoskeletal Surgical History: Reports: Carpal Tunnel, Knee Replacement


Oncologic Surgical History: Reports: Bone Marrow Aspiration, Other (See Below)


Other Oncologic Surgeries/Procedures: lymph node biopsy


Dermatological Surgical History: Reports: None





- SUBSTANCE USE


Smoking Status *Q: Never Smoker


Tobacco Use Within Last Twelve Months: No


Second Hand Smoke Exposure: No


Days Per Week of Alcohol Use: 0


Recreational Drug Use History: No





- HOME MEDS


Home Medications: 


 Home Meds





Gabapentin [Neurontin] 300 mg PO TID 10/27/16 [History]


Hydroxychloroquine [Plaquenil] 400 mg PO Q48H 10/27/16 [History]


Losartan/Hydrochlorothiazide [Losartan-HCTZ 50-12.5 MG] 1 tab PO BID 10/27/16 [

History]


Aspirin [Halfprin] 81 mg PO DAILY 07/28/17 [History]


Betamethasone Valerate 1 applic TP ASDIRECTED PRN 07/28/17 [History]


Biotin 5,000 mcg PO DAILY 07/28/17 [History]


Cranberry 400 mg PO BID 07/28/17 [History]


FA/Lycopene/Lut/MV,Ca,Iron,Min [Centrum] 1 tab PO DAILY 07/28/17 [History]


Lysine 500 mg PO DAILY 07/28/17 [History]


Acetaminophen [Tylenol] 650 mg PO Q4H PRN 02/15/19 [History]


Diclofenac Sodium [Voltaren 1% Gel] 1 dose TOP BID PRN 02/15/19 [History]


Hydroxychloroquine [Plaquenil] 200 mg PO Q48H 02/15/19 [History]


azaTHIOprine [Imuran] 50 mg PO BID 02/15/19 [History]


dilTIAZem HCl [Diltiazem 24Hr ER] 300 mg PO DAILY 02/15/19 [History]











- CURRENT (IN HOUSE) MEDS


Current Meds: 





 Current Medications





Epinephrine HCl (Adrenalin)  3 mg .XX ONETIME ONE


   Stop: 02/18/19 07:01


Lactated Ringer's (Ringers, Lactated)  1,000 mls @ 125 mls/hr IV ASDIRECTED [place]


   Stop: 02/18/19 23:00


Lidocaine/Sodium Bicarbonate (Buffered Lidocaine 1% In Ns 8.4%)  0.25 ml IDERM 

ONETIME PRN


   PRN Reason: Prior to IV Start


   Stop: 02/18/19 18:00


Sodium Chloride (Saline Flush)  10 ml FLUSH ASDIRECTED PRN


   PRN Reason: Keep Vein Open


   Stop: 02/18/19 18:00





Discontinued Medications





Cefazolin Sodium (Ancef) Confirm Administered Dose 2 gm .ROUTE .STK-MED ONE


   Stop: 02/18/19 06:31


Epinephrine HCl (Adrenalin)  3 mg .XX ONETIME ONE


   Stop: 02/18/19 08:01


Epinephrine HCl (Adrenalin) Confirm Administered Dose 1 mg .ROUTE .STK-MED ONE


   Stop: 02/18/19 06:27


Fentanyl (Sublimaze) Confirm Administered Dose 100 mcg .ROUTE .STK-MED ONE


   Stop: 02/18/19 06:30


Fentanyl (Sublimaze) Confirm Administered Dose 250 mcg .ROUTE .STK-MED ONE


   Stop: 02/18/19 06:36


Lidocaine HCl (Xylocaine-Mpf 1%) Confirm Administered Dose 4 mls @ as directed 

.ROUTE .STK-MED ONE


   Stop: 02/18/19 06:29


Lidocaine HCl (Xylocaine-Mpf 1%) Confirm Administered Dose 4 mls @ as directed 

.ROUTE .STK-MED ONE


   Stop: 02/18/19 06:30


Midazolam HCl (Versed 1 Mg/Ml) Confirm Administered Dose 2 mg .ROUTE .STK-MED 

ONE


   Stop: 02/18/19 06:30


Ondansetron HCl (Zofran) Confirm Administered Dose 4 mg .ROUTE .STK-MED ONE


   Stop: 02/18/19 06:31


Propofol (Diprivan  20 Ml) Confirm Administered Dose 400 mg .ROUTE .STK-MED ONE


   Stop: 02/18/19 06:30


Rocuronium Bromide (Zemuron) Confirm Administered Dose 50 mg .ROUTE .STK-MED ONE


   Stop: 02/18/19 06:31


Ropivacaine (Naropin 0.5%) Confirm Administered Dose 30 ml .ROUTE .STK-MED ONE


   Stop: 02/18/19 06:27

## 2019-02-18 NOTE — PCM.SN
- Free Text/Narrative


Note: 





02/18/2019 [id number] Time out performed. Requested to place left interscale 

block with ultrasound guidance and nerve stimulator for post op pain control 

per Dr. Renae and patient. 





Preop diagnosis left shoulder pain.





 Procedure is left shoulder arthrosocopy. 


 Informed consent obtained. Monitors and O2 placed at 2 l per n/c. Versed 1 mg 

and Fentanyl 50 mcg given IV total. Patient awake and talking during procedure. 

Left neck and clavicle area prepped with chlorprep times 2. Sterile gloves, hat 

and mask worn. US probe with sterile sleeve placed midclavicular with ID of 

brachial plexus and subclavian artery. Brachial plexus followed cephalad to 

level of cricoid. Lidocaine 1% local anesthetic injected prior to block 

placement. 22 g 2 inch stimplex needle advanced with US guidance to brachial 

plexus. Positive forearm response at .3mA with nerve stimulator. Ceased with 

saline injection. Ropivacaine 0.5% with epi 1:200,000 injected in increments of 

5 ml with negative aspiration before each injection to a total of 30 ml.  Good 

spread of local anesthetic seen on US. Patient tolerated procedure well. Vitals 

stable with no complaints.

## 2019-02-21 NOTE — PCM.OPNOTE
- General Post-Op/Procedure Note


Date of Surgery/Procedure: 02/18/19


Operative Procedure(s): left shoulder video arthroscopy with extensive 

debridement


Pre Op Diagnosis: left should rotator cuff tear


Post-Op Diagnosis: same with grade 3/4 chondromalacia


Anesthesia Technique: General ET Tube, Regional Block


Primary Surgeon: Claude Renae


Anesthesia Provider: Cierra Nolan


Assistant: Lexie Crawford


EBL in mLs: 5


Complications: None


Condition: Good

## 2019-02-21 NOTE — OR
DATE OF OPERATION:  02/18/2019

 

SURGEON:  Claude eRnae MD

 

OPERATION PERFORMED:  Left shoulder video arthroscopy with extensive

debridement.

 

PREOPERATIVE DIAGNOSIS:

Left shoulder rotator cuff tear.

 

POSTOPERATIVE DIAGNOSIS:

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

glenoid and humeral head.

 

ANESTHESIA:

General endotracheal intubation with regional interscalene block.

 

ANESTHESIA PROVIDER:

Cierra Nolan CRNA.

 

ASSISTANT:

Lexie Crawford LPN.

 

ESTIMATED BLOOD LOSS:

Less than 5 mL.

 

COMPLICATIONS:

None.

 

CONDITION:

Stable.

 

DESCRIPTION OF PROCEDURE:

The patient was identified in the preoperative holding area.  Proper site was

marked and identified by the surgeon.  The patient was taken back to the

operating theater, where after adequate anesthesia, the patient was placed in a

lazy right lateral decubitus position.  Wedge was placed posteriorly.  The

patient was secured to the table.  All bony prominences were well padded.  Left

upper extremity was then sterilely prepped and draped in the usual sterile

fashion.  OR time-out was performed.  The patient received 2 g of IV Ancef.  A

10 pounds of traction was applied to the left upper extremity.  At this time,

posterior incision was made.  Scope trocar was introduced to the glenohumeral

joint.  With the use of a spinal needle, anterior portal was then also created.

The patient was noted to have significant grade 3/4 chondromalacia of both the

glenoid and humerus at this time, as well as an extensive full-thickness rotator

cuff tear both at the superior and anterior portion of the infraspinatus.  At

this time, I did debride significant synovitis as well as any loose cartilage

pieces.  At this time, it was decided that we would not do a rotator cuff repair

as the patient is in need of a reverse total shoulder arthroplasty.  Excess

saline was drained from the shoulder.  A 3-0 nylon suture was used for closure

of skin.  The patient was placed in a sling and sent to the PACU in stable

condition.

 

DD:  02/21/2019 10:15:31

DT:  02/21/2019 10:27:55  MMODAL

Job #:  [id number]

## 2019-04-01 ENCOUNTER — HOSPITAL ENCOUNTER (INPATIENT)
Dept: HOSPITAL 41 - JD.OB | Age: 79
LOS: 1 days | Discharge: HOME | DRG: 483 | End: 2019-04-02
Attending: ORTHOPAEDIC SURGERY | Admitting: ORTHOPAEDIC SURGERY
Payer: MEDICARE

## 2019-04-01 DIAGNOSIS — M85.89: ICD-10-CM

## 2019-04-01 DIAGNOSIS — I10: ICD-10-CM

## 2019-04-01 DIAGNOSIS — Z79.82: ICD-10-CM

## 2019-04-01 DIAGNOSIS — Z86.010: ICD-10-CM

## 2019-04-01 DIAGNOSIS — M54.5: ICD-10-CM

## 2019-04-01 DIAGNOSIS — G89.18: ICD-10-CM

## 2019-04-01 DIAGNOSIS — Z96.659: ICD-10-CM

## 2019-04-01 DIAGNOSIS — I73.00: ICD-10-CM

## 2019-04-01 DIAGNOSIS — D61.818: ICD-10-CM

## 2019-04-01 DIAGNOSIS — H35.30: ICD-10-CM

## 2019-04-01 DIAGNOSIS — Z87.440: ICD-10-CM

## 2019-04-01 DIAGNOSIS — M32.9: ICD-10-CM

## 2019-04-01 DIAGNOSIS — Z88.2: ICD-10-CM

## 2019-04-01 DIAGNOSIS — Z88.8: ICD-10-CM

## 2019-04-01 DIAGNOSIS — H54.7: ICD-10-CM

## 2019-04-01 DIAGNOSIS — M75.102: ICD-10-CM

## 2019-04-01 DIAGNOSIS — Z88.1: ICD-10-CM

## 2019-04-01 DIAGNOSIS — G89.29: ICD-10-CM

## 2019-04-01 DIAGNOSIS — M19.012: Primary | ICD-10-CM

## 2019-04-01 DIAGNOSIS — Z79.899: ICD-10-CM

## 2019-04-01 PROCEDURE — 3E0T3BZ INTRODUCTION OF ANESTHETIC AGENT INTO PERIPHERAL NERVES AND PLEXI, PERCUTANEOUS APPROACH: ICD-10-PCS

## 2019-04-01 PROCEDURE — 0RRK00Z REPLACEMENT OF LEFT SHOULDER JOINT WITH REVERSE BALL AND SOCKET SYNTHETIC SUBSTITUTE, OPEN APPROACH: ICD-10-PCS | Performed by: ORTHOPAEDIC SURGERY

## 2019-04-01 RX ADMIN — KETOROLAC TROMETHAMINE PRN MG: 15 INJECTION, SOLUTION INTRAMUSCULAR; INTRAVENOUS at 20:52

## 2019-04-01 NOTE — PCM.CONS
H&P History of Present Illness





- General


Date of Service: 04/01/19


Admit Problem/Dx: 


 Admission Diagnosis/Problem





Admission Diagnosis/Problem      Osteoarthritis of shoulder








Source of Information: Patient, Old Records, Provider, RN, RN Notes Reviewed


History Limitations: Reports: No Limitations





- History of Present Illness


Initial Comments - Free Text/Narative: 


Saray Gastelum is a 79 yo female patient of Dr. Renae who is post-operative day 

0 of reverse total shoulder arthroplasty. Hospital medicine was consulted for 

post-operative medical care. At this time she is resting comfortably in bed. 

Pain is controlled. She denies any chest pain, shortness of breath, palpitations

, nausea, or vomiting. She carries a history of: Pancytopenia, Discord Lupus, 

Raynauds disease, chronic lower back pain, microhematuria, osteopenia, HTN, 

thrombocytopenia. She was never a smoker.





She is a full code. Her primary care provider is Dr. Ding.





  ** Left Shoulder


Pain Score (Numeric/FACES): 0





- Related Data


Allergies/Adverse Reactions: 


 Allergies











Allergy/AdvReac Type Severity Reaction Status Date / Time


 


ACE Inhibitors Allergy  Cannot Verified 02/15/19 12:55





   Remember  


 


amoxicillin Allergy  Cannot Verified 02/15/19 12:55





   Remember  


 


Sulfa (Sulfonamide Allergy  Cannot Verified 02/15/19 12:55





Antibiotics)   Remember  











Home Medications: 


 Home Meds





Gabapentin [Neurontin] 300 mg PO TID 10/27/16 [History]


Hydroxychloroquine [Plaquenil] 400 mg PO Q48H 10/27/16 [History]


Losartan/Hydrochlorothiazide [Losartan-HCTZ 50-12.5 MG] 1 tab PO BID 10/27/16 [

History]


Aspirin [Halfprin] 81 mg PO DAILY 07/28/17 [History]


Betamethasone Valerate 1 applic TP ASDIRECTED PRN 07/28/17 [History]


Biotin 5,000 mcg PO DAILY 07/28/17 [History]


Cranberry 400 mg PO BID 07/28/17 [History]


FA/Lycopene/Lut/MV,Ca,Iron,Min [Centrum] 1 tab PO DAILY 07/28/17 [History]


Lysine 500 mg PO DAILY 07/28/17 [History]


Acetaminophen [Tylenol] 650 mg PO Q4H PRN 02/15/19 [History]


Diclofenac Sodium [Voltaren 1% Gel] 1 dose TOP BID PRN 02/15/19 [History]


Hydroxychloroquine [Plaquenil] 200 mg PO Q48H 02/15/19 [History]


azaTHIOprine [Imuran] 50 mg PO BID 02/15/19 [History]


dilTIAZem HCl [Diltiazem 24Hr ER] 300 mg PO DAILY 02/15/19 [History]











Past Medical History


HEENT History: Reports: Impaired Vision, Macular Degeneration, Other (See Below)


Other HEENT History: has glasses, dentures


Cardiovascular History: Reports: Hypertension


Other Cardiovascular History: Dependent edema


Respiratory History: Reports: None


Gastrointestinal History: Reports: Colon Polyp


Genitourinary History: Reports: None


OB/GYN History: Reports: None


Musculoskeletal History: Reports: Osteoarthritis, Other (See Below)


Other Musculoskeletal History: left shoulder pain


Neurological History: Reports: None


Other Neuro History: Mild kyphosis


Psychiatric History: Reports: None


Endocrine/Metabolic History: Reports: Osteopenia


Other Endocrine/Metabolic History: Lupus


Hematologic History: Reports: Other (See Below)


Other Hematologic History: thrombocytopenia, pancytopenia


Immunologic History: Reports: SLE


Other Immunologic History: Lupus


Oncologic (Cancer) History: Reports: None


Dermatologic History: Reports: None


Other Dermatologic History: Dry skin





- Infectious Disease History


Infectious Disease History: Reports: Shingles





- Past Surgical History


Cardiovascular Surgical History: Reports: None


Respiratory Surgical History: Reports: None


GI Surgical History: Reports: Colonoscopy


Female  Surgical History: Reports: D&C


Male  Surgical History: Reports: None


Endocrine Surgical History: Reports: None


Neurological Surgical History: Reports: None


Musculoskeletal Surgical History: Reports: Carpal Tunnel, Knee Replacement


Oncologic Surgical History: Reports: Bone Marrow Aspiration, Other (See Below)


Other Oncologic Surgeries/Procedures: lymph node biopsy


Dermatological Surgical History: Reports: None





Social & Family History





- Family History


Family Medical History: Noncontributory





- Tobacco Use


Smoking Status *Q: Never Smoker


Second Hand Smoke Exposure: No





- Caffeine Use


Caffeine Use: Reports: Coffee


Caffeine Use Comment: for breakfast





- Recreational Drug Use


Recreational Drug Use: No





H&P Review of Systems





- Review of Systems:


Review Of Systems: See Below


General: Reports: No Symptoms.  Denies: Fever, Chills, Malaise, Fatigue


HEENT: Reports: No Symptoms.  Denies: Headaches, Sore Throat


Pulmonary: Reports: No Symptoms.  Denies: Shortness of Breath, Wheezing, 

Pleuritic Chest Pain, Cough, Sputum


Cardiovascular: Reports: No Symptoms.  Denies: Chest Pain, Palpitations, 

Dyspnea on Exertion, Edema


Gastrointestinal: Reports: No Symptoms.  Denies: Abdominal Pain, Constipation, 

Diarrhea, Nausea, Vomiting


Genitourinary: Reports: No Symptoms.  Denies: Pain


Musculoskeletal: Reports: Shoulder Pain


Skin: Reports: No Symptoms.  Denies: Cyanosis


Psychiatric: Reports: No Symptoms.  Denies: Confusion


Neurological: Reports: No Symptoms


Hematologic/Lymphatic: Reports: No Symptoms


Immunologic: Reports: No Symptoms





Exam





- Exam


Exam: See Below





- Vital Signs


Vital Signs: 


 Last Vital Signs











Temp  97.9 F   04/01/19 06:30


 


Pulse  91   04/01/19 06:30


 


Resp  16   04/01/19 06:30


 


BP  146/79 H  04/01/19 06:30


 


Pulse Ox  99   04/01/19 06:30











Weight: 130 lb





- Exam


Quality Assessment: DVT Prophylaxis


General: Alert, Oriented, Cooperative.  No: Mild Distress


HEENT: Conjunctiva Clear, EACs Clear, EOMI, Hearing Intact, Mucosa Moist & Pink

, Normal Nasal Septum, PERRLA


Neck: Supple, Trachea Midline


Lungs: Clear to Auscultation, Normal Respiratory Effort


Cardiovascular: Regular Rate, Regular Rhythm


GI/Abdominal Exam: Normal Bowel Sounds, Soft, Non-Tender, No Organomegaly, No 

Distention


 (Female) Exam: Deferred


Rectal (Female) Exam: Deferred


Back Exam: Normal Inspection, Full Range of Motion


Extremities: No Pedal Edema, Normal Capillary Refill, Arm Pain, Limited Range 

of Motion, Other (Bandage in place on left shoulder. Bandage is dry and intact. 

Cooling pack in place. )


Peripheral Pulses: 2+: Radial (L), Radial (R), Dorsalis Pedis (L), Dorsalis 

Pedis (R)


Skin: Warm, Dry, Intact


Neurological: Cranial Nerves Intact (grossly )


Neuro Extensive - Mental Status: Alert, Oriented x3, Normal Mood/Affect, Normal 

Cognition





Consult PN Assessment/Plan


POD#: 0


Procedures: 


Procedures





ARTHROSCOP ROTATOR CUFF REPR (02/18/19)


BONE IMAGING 3 PHASE (05/08/17)


COMPLETE CBC W/AUTO DIFF WBC (07/31/17)


COMPREHEN METABOLIC PANEL (07/31/17)


DRAIN/INJ JOINT/BURSA W/O US (12/13/16)


DXA BONE DENSITY AXIAL (06/07/17)


EMERGENCY DEPT VISIT (10/27/16)


EMERGENCY DEPT VISIT (06/10/15)


GAIT TRAINING THERAPY (07/31/17)


MEASURE BLOOD OXYGEN LEVEL (07/31/17)


MR-STAPH DNA AMP PROBE (02/06/19)


MRI JOINT UPR EXTREM W/O DYE (12/20/18)


N BLOCK INJ BRACHIAL PLEXUS (02/18/19)


OT EVAL LOW COMPLEX 30 MIN (07/31/17)


PT EVAL MOD COMPLEX 30 MIN (07/31/17)


ROUTINE VENIPUNCTURE (07/31/17)


SELF CARE MNGMENT TRAINING (07/31/17)


SHOULDER ARTHROSCOPY/SURGERY (02/18/19)


THERAPEUTIC ACTIVITIES (07/31/17)


THERAPEUTIC EXERCISES (07/31/17)


X-RAY EXAM OF ANKLE (06/10/15)


X-RAY EXAM OF KNEE 1 OR 2 (07/31/17)


X-RAY EXAM OF LOWER LEG (06/10/15)


X-RAY EXAM OF WRIST (10/27/16)








(1) S/p reverse total shoulder arthroplasty


SNOMED Code(s): 012751465, 615979476


   Code(s): Z96.619 - PRESENCE OF UNSPECIFIED ARTIFICIAL SHOULDER JOINT   

Priority: High   Current Visit: Yes   


Qualifiers: 


   Laterality: left   Qualified Code(s): Z96.612 - Presence of left artificial 

shoulder joint   





(2) Raynauds disease


SNOMED Code(s): 803506529


   Code(s): I73.00 - RAYNAUD'S SYNDROME WITHOUT GANGRENE   Priority: Low   

Current Visit: No   


Qualifiers: 


   Raynaud?s-associated gangrene presence: without gangrene   Qualified Code(s)

: I73.00 - Raynaud's syndrome without gangrene   





(3) Chronic lower back pain


SNOMED Code(s): 941175525


   Code(s): M54.5 - LOW BACK PAIN; G89.29 - OTHER CHRONIC PAIN   Priority: Low 

  Current Visit: No   


Qualifiers: 


   Back pain laterality: unspecified   Sciatica presence: unspecified whether 

sciatica present   Qualified Code(s): M54.5 - Low back pain; G89.29 - Other 

chronic pain   





(4) HTN (hypertension)


SNOMED Code(s): 47570446


   Code(s): I10 - ESSENTIAL (PRIMARY) HYPERTENSION   Priority: Low   Current 

Visit: No   


Qualifiers: 


   Hypertension type: essential hypertension   Qualified Code(s): I10 - 

Essential (primary) hypertension   





(5) Lupus


SNOMED Code(s): 72183581


   Code(s): M32.9 - SYSTEMIC LUPUS ERYTHEMATOSUS, UNSPECIFIED   Priority: 

Medium   Current Visit: No   


Qualifiers: 


   Systemic lupus erythematosus type: unspecified   Systemic lupus 

erythematosus organ involvement: unspecified   Qualified Code(s): M32.9 - 

Systemic lupus erythematosus, unspecified   





(6) Macular degeneration


SNOMED Code(s): 589446517


   Code(s): H35.30 - UNSPECIFIED MACULAR DEGENERATION   Priority: Medium   

Current Visit: No   





(7) Osteoarthritis


SNOMED Code(s): 228282967


   Code(s): M19.90 - UNSPECIFIED OSTEOARTHRITIS, UNSPECIFIED SITE   Priority: 

High   Current Visit: Yes   


Qualifiers: 


   Osteoarthritis location: shoulder   Osteoarthritis type: primary   Laterality

: left   Qualified Code(s): M19.012 - Primary osteoarthritis, left shoulder   





(8) Osteopenia


SNOMED Code(s): 188491993


   Code(s): M85.80 - OTH DISRD OF BONE DENSITY AND STRUCTURE, UNSPECIFIED SITE 

  Priority: Medium   Current Visit: No   


Qualifiers: 


   Osteopenia location: unspecified   Qualified Code(s): M85.80 - Other 

specified disorders of bone density and structure, unspecified site   





(9) Pancytopenia


SNOMED Code(s): 295254189


   Code(s): D61.818 - OTHER PANCYTOPENIA   Priority: Medium   Current Visit: No

   


Problem List Initiated/Reviewed/Updated: Yes


Plan: 


I/P:





Acute:





S/P left reverse total shoulder arthroplasty - post-operative day 0


   -DVT prophylaxis and pain management per primary care team


   -PT/OT 


   -IS/RT


   -Monitor oxygen saturation


   -Titrate oxygen as needed


   -Vital signs stable 


   -Monitor labs


      -Pre-operative Hgb was 13.9


      -Pre-operative GFR was >90


      -Pre-operative BUN was 16


      -Pre-operative creatinine was 0.5   


      -Pre-operative platelet count was 150


      -Pre-operative sodium of 134





Osteoarthritis of left shoulder


   -Pain management per primary care team





Chronic:





Pancytopenia


Discord lupus


Raynauds


Chronic lower back pain


microhematuria


osteopenia


HTN


Thrombocytopenia





Plan:


CM for discharge planning


GI prophylaxis 


Home medications as indicated


Other orders as listed above 


Routine AM labs





She is a full code. Her PCP is Dr. Ding





Thank you for allowing us to participate in the care of this patient!! 








Requesting Provider: Dr. Renae 


Date Consult Requested: 04/01/19


Reason for Consult: Post-opeartive medical management 


Patient History Reviewed: Yes


Admission H&P Reviewed: Yes


Time Spent (in minutes): 40

## 2019-04-01 NOTE — CR
Left shoulder:  Two  fluoroscopic spot views were obtained of the left

 shoulder.  Study obtained utilizing C-arm device.

 

Study is a procedural exam showing placement of reverse left shoulder 

prosthesis.  No gross bony abnormality is appreciated.  Fluoroscopy 

time is given as 1.7 seconds.

 

Impression:

1.  Reverse left shoulder prosthesis.

 

Diagnostic code #1

## 2019-04-01 NOTE — PCM.SN
- Free Text/Narrative


Note: 





Anesthesia Note: (Left Interscalene Block Note)


Date: 04/01/2019


Time Out: 0741


Start: 0741


Stop: 0806





Surgical Procedure:  Left Reverse Total Shoulder Arthroplasty


Diagnosis Left shoulder osteoarthritis


Current Procedure: Left interscalene block under US guidance for postoperative 

pain control requested by Dr. Renae.     


Patient chart reviewed, risk/benefits discussed with patient, consent obtained.


Patient positioned supine, monitors/alarms on, oxygen placed via nasal cannula 

at 2 LPM.





IV sedation administered:


 Fentanyl 50mcg IV given pre-procedure.


Left shoulder prepped with two chloropreps. Sterile drapes placed with aseptic 

technique noted.  Under US guidance, left subclavian artery visualized along 

with the left brachial plexus.  Plexus followed up to C6 cricoid level, and 

area localized with 2mls of 1% lidocaine.  





22gauge 2 inch stimiplex needle advanced under US with 0.6mV with stimulation 

of biceps noted. Good stimulation noted with decreased voltage and absent at 

0.2mVs.  1ml of Normal Saline injected with loss of stimulation noted to 

confirm needle not placed intraneurally.





Incremental dosing of 5mls with negative aspiration noted prior to each 

injection of 0.5% ropivacaine with 1:200,000 epinephrine. Total volume=30mls.





Please refer to nurses noted for vital signs.


Johanne Baxter CRNA

## 2019-04-01 NOTE — CR
Left shoulder: Single AP view of the left shoulder was obtained.

 

Comparison: Previous MRI left shoulder study of 12/20/18.

 

Left shoulder prosthesis is seen.  Components are aligned.  Underlying

 bony structures are intact.

 

Impression:

1.  Satisfactory appearance of recently placed left shoulder 

prosthesis.

 

Diagnostic code #2

## 2019-04-01 NOTE — PCM.POSTAN
POST ANESTHESIA ASSESSMENT





- MENTAL STATUS


Mental Status: Alert





- VITAL SIGNS


Pulse Rate: 73


SaO2: 98


Resp Rate: 12


Blood Pressure: 104/65


Temperature: 36.6 C





- RESPIRATORY


Respiratory Status: Respiratory Rate WNL, Airway Patent, O2 Saturation Stable





- CARDIOVASCULAR


CV Status: Pulse Rate WNL, Blood Pressure Stable





- GASTROINTESTINAL


GI Status: No Symptoms





- PAIN


Pain Score: 0





- POST OP HYDRATION


Hydration Status: Adequate & Stable

## 2019-04-01 NOTE — PCM.PREANE
Preanesthetic Assessment





- Procedure


Proposed Procedure: 





Left Shoulder Reverse Total Arthroplasty 





- Anesthesia/Transfusion/Family Hx


Anesthesia History: Prior Anesthesia Without Reaction


Family History of Anesthesia Reaction: No


Transfusion History: No Prior Transfusion(s)





- Review of Systems


General: No Symptoms


Pulmonary: No Symptoms


Cardiovascular: No Symptoms


Gastrointestinal: No Symptoms


Neurological: No Symptoms


Other: Reports: None





- Physical Assessment


NPO Status Date: 03/31/19


NPO Status Time: 20:30


O2 Sat by Pulse Oximetry: 99


Respiratory Rate: 16


Vital Signs: 





 Last Vital Signs











Temp  36.6 C   04/01/19 06:30


 


Pulse  91   04/01/19 06:30


 


Resp  16   04/01/19 06:30


 


BP  146/79 H  04/01/19 06:30


 


Pulse Ox  99   04/01/19 06:30











Height: 1.6 m


Weight: 58.967 kg


ASA Class: 2


Mental Status: Alert & Oriented x3


Airway Class: Mallampati = 1


Dentition: Reports: Dentures (upper), Partial (lower )


Thyro-Mental Finger Breadths: 3


Mouth Opening Finger Breadths: 4


ROM/Head Extension: Full


Lungs: Clear to Auscultation, Normal Respiratory Effort


Cardiovascular: Regular Rate, Regular Rhythm





- Lab


Values: 





 Laboratory Last Values











MRSA (PCR)  Negative   03/18/19  09:37    














- Allergies


Allergies/Adverse Reactions: 


 Allergies











Allergy/AdvReac Type Severity Reaction Status Date / Time


 


ACE Inhibitors Allergy  Cannot Verified 02/15/19 12:55





   Remember  


 


amoxicillin Allergy  Cannot Verified 02/15/19 12:55





   Remember  


 


Sulfa (Sulfonamide Allergy  Cannot Verified 02/15/19 12:55





Antibiotics)   Remember  














- Blood


Blood Available: No





- Acknowledgements


Anesthesia Type Planned: General Anesthesia, Regional Block


Pt an Appropriate Candidate for the Planned Anesthesia: Yes


Alternatives and Risks of Anesthesia Discussed w Pt/Guardian: Yes


Pt/Guardian Understands and Agrees with Anesthesia Plan: Yes





PreAnesthesia Questionnaire


HEENT History: Reports: Impaired Vision, Macular Degeneration, Other (See Below)


Other HEENT History: has glasses, dentures


Cardiovascular History: Reports: Hypertension


Other Cardiovascular History: Dependent edema


Respiratory History: Reports: None


Gastrointestinal History: Reports: Colon Polyp


Genitourinary History: Reports: None


OB/GYN History: Reports: None


Musculoskeletal History: Reports: Osteoarthritis, Other (See Below)


Other Musculoskeletal History: left shoulder pain


Neurological History: Reports: None


Other Neuro History: Mild kyphosis


Psychiatric History: Reports: None


Endocrine/Metabolic History: Reports: Osteopenia


Other Endocrine/Metabolic History: Lupus


Hematologic History: Reports: Other (See Below)


Other Hematologic History: thrombocytopenia, pancytopenia


Immunologic History: Reports: SLE


Other Immunologic History: Lupus


Oncologic (Cancer) History: Reports: None


Dermatologic History: Reports: None


Other Dermatologic History: Dry skin





- Infectious Disease History


Infectious Disease History: Reports: Shingles





- Past Surgical History


Cardiovascular Surgical History: Reports: None


Respiratory Surgical History: Reports: None


GI Surgical History: Reports: Colonoscopy


Female  Surgical History: Reports: D&C


Male  Surgical History: Reports: None


Endocrine Surgical History: Reports: None


Neurological Surgical History: Reports: None


Musculoskeletal Surgical History: Reports: Carpal Tunnel, Knee Replacement


Oncologic Surgical History: Reports: Bone Marrow Aspiration, Other (See Below)


Other Oncologic Surgeries/Procedures: lymph node biopsy


Dermatological Surgical History: Reports: None





- SUBSTANCE USE


Smoking Status *Q: Never Smoker


Second Hand Smoke Exposure: No


Recreational Drug Use History: No





- HOME MEDS


Home Medications: 


 Home Meds





Gabapentin [Neurontin] 300 mg PO TID 10/27/16 [History]


Hydroxychloroquine [Plaquenil] 400 mg PO Q48H 10/27/16 [History]


Losartan/Hydrochlorothiazide [Losartan-HCTZ 50-12.5 MG] 1 tab PO BID 10/27/16 [

History]


Aspirin [Halfprin] 81 mg PO DAILY 07/28/17 [History]


Betamethasone Valerate 1 applic TP ASDIRECTED PRN 07/28/17 [History]


Biotin 5,000 mcg PO DAILY 07/28/17 [History]


Cranberry 400 mg PO BID 07/28/17 [History]


FA/Lycopene/Lut/MV,Ca,Iron,Min [Centrum] 1 tab PO DAILY 07/28/17 [History]


Lysine 500 mg PO DAILY 07/28/17 [History]


Acetaminophen [Tylenol] 650 mg PO Q4H PRN 02/15/19 [History]


Diclofenac Sodium [Voltaren 1% Gel] 1 dose TOP BID PRN 02/15/19 [History]


Hydroxychloroquine [Plaquenil] 200 mg PO Q48H 02/15/19 [History]


azaTHIOprine [Imuran] 50 mg PO BID 02/15/19 [History]


dilTIAZem HCl [Diltiazem 24Hr ER] 300 mg PO DAILY 02/15/19 [History]











- CURRENT (IN HOUSE) MEDS


Current Meds: 





 Current Medications





Hydrocodone Bitart/Acetaminophen (Norco 325-5 Mg)  1 - 2 tab PO Q4H PRN


   PRN Reason: Pain


Aspirin (Ecotrin)  325 mg PO DAILY [facility]


Bisacodyl (Dulcolax)  5 mg PO DAILY PRN


   PRN Reason: Constipation


Cyclobenzaprine HCl (Flexeril)  5 mg PO BID PRN


   PRN Reason: Spasms


Famotidine (Pepcid)  20 mg PO Q12H [facility]


Lactated Ringer's (Ringers, Lactated)  1,000 mls @ 125 mls/hr IV ASDIRECTED [facility]


   Last Admin: 04/01/19 06:50 Dose:  125 mls/hr


Cefazolin Sodium/Dextrose 2 gm (/ Premix)  50 mls @ 100 mls/hr IV Q8H [facility]


   Stop: 04/02/19 07:29


Ketorolac Tromethamine (Toradol)  15 mg IVPUSH Q6H PRN


   PRN Reason: Pain


Lidocaine/Sodium Bicarbonate (Buffered Lidocaine 1% In Ns 8.4%)  0.25 ml IDERM 

ONETIME PRN


   PRN Reason: Prior to IV Start


   Last Admin: 04/01/19 06:50 Dose:  0.25 ml


Magnesium Hydroxide (Milk Of Magnesia)  30 ml PO BID PRN


   PRN Reason: Constipation


Morphine Sulfate (Morphine)  2 mg IVPUSH Q2H PRN


   PRN Reason: Breakthrough Pain


Naloxone HCl (Narcan)  0.1 mg IVPUSH Q5M PRN


   PRN Reason: Oversedation


Ondansetron HCl (Zofran)  4 mg IVPUSH Q6H PRN


   PRN Reason: Nausea/Vomiting


Senna (Senna)  8.6 mg PO BID PRN


   PRN Reason: Constipation


Sodium Chloride (Saline Flush)  10 ml FLUSH ASDIRECTED PRN


   PRN Reason: Keep Vein Open





Discontinued Medications





Acetaminophen (Tylenol)  975 mg PO ONETIME ONE


   Stop: 04/01/19 06:01


   Last Admin: 04/01/19 06:44 Dose:  975 mg


Cefazolin Sodium (Ancef) Confirm Administered Dose 2 gm .ROUTE .STK-MED ONE


   Stop: 04/01/19 06:55


Fentanyl (Sublimaze) Confirm Administered Dose 250 mcg .ROUTE .STK-MED ONE


   Stop: 04/01/19 07:28


Iodine (Iodine 2% Mild Tincture) Confirm Administered Dose 30 ml .ROUTE .STK-

MED ONE


   Stop: 04/01/19 06:55


Midazolam HCl (Versed 1 Mg/Ml) Confirm Administered Dose 2 mg .ROUTE .STK-MED 

ONE


   Stop: 04/01/19 07:28


Oxycodone HCl (Oxycontin)  10 mg PO ONETIME ONE


   Stop: 04/01/19 06:01


   Last Admin: 04/01/19 06:43 Dose:  10 mg


Pregabalin (Lyrica)  50 mg PO ONETIME ONE


   Stop: 04/01/19 06:01


   Last Admin: 04/01/19 06:43 Dose:  50 mg


Tranexamic Acid (Cyklokapron) Confirm Administered Dose 1,000 mg .ROUTE .STK-

MED ONE


   Stop: 04/01/19 06:54


Tranexamic Acid (Cyklokapron) Confirm Administered Dose 1,000 mg .ROUTE .ST-

MED ONE


   Stop: 04/01/19 07:17


Tranexamic Acid (Cyklokapron) Confirm Administered Dose 1,000 mg .ROUTE .STK-

MED ONE


   Stop: 04/01/19 07:20


Vancomycin HCl (Vancomycin) Confirm Administered Dose 1 gm .ROUTE .STK-MED ONE


   Stop: 04/01/19 06:55

## 2019-04-02 VITALS — SYSTOLIC BLOOD PRESSURE: 104 MMHG | DIASTOLIC BLOOD PRESSURE: 57 MMHG

## 2019-04-02 RX ADMIN — HYDROCODONE BITARTRATE AND ACETAMINOPHEN PRN TAB: 5; 325 TABLET ORAL at 11:24

## 2019-04-02 RX ADMIN — KETOROLAC TROMETHAMINE PRN MG: 15 INJECTION, SOLUTION INTRAMUSCULAR; INTRAVENOUS at 03:05

## 2019-04-02 RX ADMIN — HYDROCODONE BITARTRATE AND ACETAMINOPHEN PRN TAB: 5; 325 TABLET ORAL at 06:02

## 2019-04-02 NOTE — PCM.DCSUM1
**Discharge Summary





- Hospital Course


Brief History: Saray is a 77 yo female who underwent left reverse TSA with 

Dr. Renae on 4-1-2019.  The procedure was completed under general anesthesia 

with regional block.  The pt tolerated the procedure well and was admitted to 

the Ob/Gyn Unit under Medical-Surgical status.  Medical management was provided 

by the Hospitalist service.  The pt's Hospital course was uneventful.  The pt's 

Hgb on POD#1 was 10.9.  On POD#1, 325mg ASA daily was initiated for VTE 

prophylaxis.  SCDs and TEDs were also ordered.  A Mepilex dressing was placed 

at the incision site at the time of surgery and remained clean and dry.  The pt 

participated in P.T. and O.T. and progressed well.  On POD#1, the pt was deemed 

appropriate to discharge to home with her .





- Discharge Data


Discharge Date: 04/02/19


Discharge Disposition: Home, Self-Care 01


Condition: Good





- Patient Summary/Data


Consults: 


 Consultations





04/01/19 06:52


OT Evaluation and Treatment [CONS] Routine 


PT Evaluation and Treatment [CONS] Routine 





04/01/19 06:54


Consult to Physician [CONS] Routine 














- Patient Instructions


Diet: Usual Diet as Tolerated


Activity: Apply Ice, As Tolerated, Elevate Extremity


Activity, Other: No forceful use of the surgical limb.


Driving: Do Not Drive


Showering/Bathing: May Shower


Wound/Incision Care: Keep Operative Site/Wound Site Clean and Dry, Do NOT 

Change Dressing


Notify Provider of: Fever, Increased Pain, Swelling and Redness, Drainage, 

Nausea and/or Vomiting


Other/Special Instructions: Please get up and moving around EVERY HOUR while 

awake.  Take a short walk every hour while awake.  This helps to prevent blood 

clots.  Have help with mobility as needed.  Please take 325mg aspirin daily - 

this also helps to prevent blood clots.  The medication is being used for blood 

clot prevention and not for pain control, so please use the medication daily as 

directed.  You do not need to use an 81mg aspirin also.  At this time, please 

use a 325mg aspirin daily.  You could use a medication like Zantac or Pepcid 

and a medication like omeprazole (Prilosec) or Nexium to protect your stomach 

while using the aspirin.  Please wear the MAYANK hose during the day and you may 

remove them at night.  Please schedule for occupational or physical therapy.  

Complete the exercises and stretches that were instructed in the Hospital.  

Wear the immobilizer as directed.  Please use the pain medication as needed.  

The medication may cause drowsiness and/or constipation.  You could use a stool 

softener like docusate sodium or Colace 100mg twice daily and/or a laxative 

like Miralax daily for constipation.  Contact your primary care provider for 

further instructions if you are constipated.  Discontinue use of the pain 

medication as soon as able.  Please do not use other medications that may cause 

drowsiness (other pain medications, anxiety pills, sleeping pills, allergy 

medications that cause drowsiness) while using the pain medication.  Please do 

not use alcohol while using the pain medication.  Use the incentive spirometer 

often.  Please place ice to the surgical site often.  Place a towel between 

your skin and the blue pad.  Please elevate the limb to decrease swelling.  

Keep the dressing in place until follow-up.  Please notify the Clinic if the 

dressing is saturated or rolls.  Increase protein intake in your diet as this 

helps with healing.  If you are a diabetic, please closely monitor your blood 

sugars and notify your primary care provider of your values.  Elevated blood 

sugars increases the risk of infection.  DO NOT resume use of azothiaprine 

until 3 days after surgery (as directed by Dr. Tafoya).  Please call [phone] 

with questions or concerns.





- Discharge Plan


*PRESCRIPTION DRUG MONITORING PROGRAM REVIEWED*: No


*COPY OF PRESCRIPTION DRUG MONITORING REPORT IN PATIENT SENDY: No


Prescriptions/Med Rec: 


Acetaminophen/HYDROcodone [Norco 325-5 MG] 1 - 2 tab PO Q6H PRN #60 tablet


 PRN Reason: Pain


Home Medications: 


 Home Meds





Gabapentin [Neurontin] 300 mg PO TID 10/27/16 [History]


Hydroxychloroquine [Plaquenil] 400 mg PO Q48H 10/27/16 [History]


Losartan/Hydrochlorothiazide [Losartan-HCTZ 50-12.5 MG] 1 tab PO BID 10/27/16 [

History]


Betamethasone Valerate 1 applic TP ASDIRECTED PRN 07/28/17 [History]


Lysine 500 mg PO DAILY 07/28/17 [History]


Acetaminophen [Tylenol] 650 mg PO Q4H PRN 02/15/19 [History]


Diclofenac Sodium [Voltaren 1% Gel] 1 dose TOP BID PRN 02/15/19 [History]


Hydroxychloroquine [Plaquenil] 200 mg PO Q48H 02/15/19 [History]


dilTIAZem HCl [Diltiazem 24Hr ER] 300 mg PO DAILY 02/15/19 [History]


Acetaminophen/HYDROcodone [Norco 325-5 MG] 1 - 2 tab PO Q6H PRN #60 tablet 04/02 /19 [Rx]


Aspirin [Ecotrin] 325 mg PO DAILY  tab.ec 04/02/19 [Rx]


Bisacodyl [Dulcolax] 5 mg PO DAILY PRN  tablet 04/02/19 [Rx]


Famotidine [Pepcid] 20 mg PO Q12H  tablet 04/02/19 [Rx]


Magnesium Hydroxide [Milk of Magnesia] 30 ml PO BID PRN  cup 04/02/19 [Rx]


Sennosides [Senna] 8.6 mg PO BID PRN  tablet 04/02/19 [Rx]








Referrals: 


Gale Lane PA-C [Primary Care Provider] - 





- Discharge Summary/Plan Comment


DC Time >30 min.: No





- Patient Data


Vitals - Most Recent: 


 Last Vital Signs











Temp  98.2 F   04/02/19 03:14


 


Pulse  94   04/02/19 03:14


 


Resp  14   04/02/19 03:14


 


BP  104/57 L  04/02/19 06:01


 


Pulse Ox  99   04/02/19 03:14











Weight - Most Recent: 130 lb


I&O - Last 24 hours: 


 Intake & Output











 04/01/19 04/02/19 04/02/19





 22:59 06:59 14:59


 


Intake Total 180  


 


Balance 180  











Lab Results - Last 24 hrs: 


 Laboratory Results - last 24 hr











  04/02/19 04/02/19 Range/Units





  06:15 06:15 


 


WBC  7.61   (3.98-10.04)  K/mm3


 


RBC  3.75 L   (3.98-5.22)  M/mm3


 


Hgb  10.9 L   (11.2-15.7)  gm/L


 


Hct  33.5 L   (34.1-44.9)  %


 


MCV  89.3   (79.4-94.8)  fl


 


MCH  29.1   (25.6-32.2)  pg


 


MCHC  32.5   (32.2-35.5)  g/dl


 


RDW Std Deviation  46.1   (36.4-46.3)  fL


 


Plt Count  137 L   (182-369)  K/mm3


 


MPV  11.4   (9.4-12.3)  fl


 


Sodium   135 L  (136-145)  mEq/L


 


Potassium   4.1  (3.5-5.1)  mEq/L


 


Chloride   101  ()  mEq/L


 


Carbon Dioxide   26  (21-32)  mEq/L


 


Anion Gap   12.1  (5-15)  


 


BUN   21 H  (7-18)  mg/dL


 


Creatinine   0.8  (0.55-1.02)  mg/dL


 


Est Cr Clr Drug Dosing   47.17  mL/min


 


Estimated GFR (MDRD)   > 60  (>60)  mL/min


 


BUN/Creatinine Ratio   26.3 H  (14-18)  


 


Glucose   114  ()  mg/dL


 


Calcium   8.4 L  (8.5-10.1)  mg/dL


 


Total Bilirubin   0.5  (0.2-1.0)  mg/dL


 


AST   21  (15-37)  U/L


 


ALT   18  (14-59)  U/L


 


Alkaline Phosphatase   58  ()  U/L


 


Total Protein   5.5 L  (6.4-8.2)  g/dl


 


Albumin   2.9 L  (3.4-5.0)  g/dl


 


Globulin   2.6  gm/dL


 


Albumin/Globulin Ratio   1.1  (1-2)  











Med Orders - Current: 


 Current Medications





Hydrocodone Bitart/Acetaminophen (Norco 325-5 Mg)  1 - 2 tab PO Q4H PRN


   PRN Reason: Pain


   Last Admin: 04/02/19 06:02 Dose:  1 tab


Aspirin (Ecotrin)  325 mg PO DAILY [facility]


Bisacodyl (Dulcolax)  5 mg PO DAILY PRN


   PRN Reason: Constipation


Cyclobenzaprine HCl (Flexeril)  5 mg PO BID PRN


   PRN Reason: Spasms


   Last Admin: 04/01/19 22:52 Dose:  5 mg


Diltiazem HCl (Cardizem Cd)  300 mg PO DAILY [facility]


Famotidine (Pepcid)  20 mg PO Q12H [facility]


   Last Admin: 04/01/19 20:50 Dose:  20 mg


Gabapentin (Neurontin)  300 mg PO TID [facility]


   Last Admin: 04/01/19 20:50 Dose:  300 mg


Hydrochlorothiazide (Hydrochlorothiazide)  12.5 mg PO BIDDIURETIC [facility]


   Last Admin: 04/02/19 06:00 Dose:  12.5 mg


Hydroxychloroquine Sulfate (Plaquenil)  200 mg PO Q48H [facility]


Hydroxychloroquine Sulfate (Plaquenil)  400 mg PO Q48H [facility]


Lactated Ringer's (Ringers, Lactated)  1,000 mls @ 125 mls/hr IV ASDIRECTED [facility]


   Last Admin: 04/01/19 06:50 Dose:  125 mls/hr


Losartan Potassium (Cozaar)  50 mg PO BIDDIURETIC [facility]


   Last Admin: 04/02/19 06:01 Dose:  50 mg


Magnesium Hydroxide (Milk Of Magnesia)  30 ml PO BID PRN


   PRN Reason: Constipation


Morphine Sulfate (Morphine)  2 mg IVPUSH Q2H PRN


   PRN Reason: Breakthrough Pain


Naloxone HCl (Narcan)  0.1 mg IVPUSH Q5M PRN


   PRN Reason: Oversedation


Ondansetron HCl (Zofran)  4 mg IVPUSH Q6H PRN


   PRN Reason: Nausea/Vomiting


Diclofenac Sodium [ (Voltaren 1% Gel])  0 each TOP BID PRN


   PRN Reason: Pain


Senna (Senna)  8.6 mg PO BID PRN


   PRN Reason: Constipation


Sodium Chloride (Saline Flush)  10 ml FLUSH ASDIRECTED PRN


   PRN Reason: Keep Vein Open





Discontinued Medications





Acetaminophen (Tylenol)  975 mg PO ONETIME ONE


   Stop: 04/01/19 06:01


   Last Admin: 04/01/19 06:44 Dose:  975 mg


Betamethasone Valerate (Valisone 0.1% Lotion)   ml TOP ASDIRECTED PRN


   PRN Reason: Pain


Cefazolin Sodium (Ancef) Confirm Administered Dose 2 gm .ROUTE .STK-MED ONE


   Stop: 04/01/19 06:55


   Last Admin: 04/01/19 09:31 Dose:  2 gm


Cefazolin Sodium/Dextrose (Ancef) Confirm Administered Dose 2 gm IV .STK-MED ONE


   Stop: 04/01/19 14:49


   Last Admin: 04/01/19 16:33 Dose:  Not Given


Dexamethasone (Dexamethasone) Confirm Administered Dose 4 mg .ROUTE .STK-MED ONE


   Stop: 04/01/19 07:45


Diphenhydramine HCl (Benadryl)  6.25 mg IVPUSH Q6H PRN


   PRN Reason: pruritis


   Stop: 04/01/19 10:30


Ephedrine Sulfate (Ephedrine In Ns) Confirm Administered Dose 25 mg .ROUTE .STK-

MED ONE


   Stop: 04/01/19 09:09


Epinephrine HCl (Adrenalin) Confirm Administered Dose 1 mg .ROUTE .STK-MED ONE


   Stop: 04/01/19 07:34


Fentanyl (Sublimaze) Confirm Administered Dose 250 mcg .ROUTE .STK-MED ONE


   Stop: 04/01/19 07:28


Fentanyl (Sublimaze)  25 mcg IVPUSH Q5M PRN


   PRN Reason: Pain


   Stop: 04/01/19 10:30


Glycopyrrolate (Robinul) Confirm Administered Dose 0.2 mg .ROUTE .STK-MED ONE


   Stop: 04/01/19 09:33


Cefazolin Sodium/Dextrose 2 gm (/ Premix)  50 mls @ 100 mls/hr IV Q8H [facility]


   Stop: 04/02/19 07:29


   Last Admin: 04/02/19 06:50 Dose:  100 mls/hr


Lidocaine HCl (Xylocaine-Mpf 1%) Confirm Administered Dose 4 mls @ as directed 

.ROUTE .STK-MED ONE


   Stop: 04/01/19 07:34


Lidocaine HCl (Xylocaine-Mpf 1%) Confirm Administered Dose 4 mls @ as directed 

.ROUTE .STK-MED ONE


   Stop: 04/01/19 07:45


Lactated Ringer's (Ringers, Lactated) Confirm Administered Dose 1,000 mls @ as 

directed .ROUTE .STK-MED ONE


   Stop: 04/01/19 09:32


Iodine (Iodine 2% Mild Tincture) Confirm Administered Dose 30 ml .ROUTE .STK-

MED ONE


   Stop: 04/01/19 06:55


   Last Admin: 04/01/19 09:28 Dose:  18 ml


Ketorolac Tromethamine (Toradol)  15 mg IVPUSH Q6H PRN


   PRN Reason: Pain


   Last Admin: 04/02/19 03:05 Dose:  15 mg


Lidocaine/Sodium Bicarbonate (Buffered Lidocaine 1% In Ns 8.4%)  0.25 ml IDERM 

ONETIME PRN


   PRN Reason: Prior to IV Start


   Last Admin: 04/01/19 06:50 Dose:  0.25 ml


Midazolam HCl (Versed 1 Mg/Ml) Confirm Administered Dose 2 mg .ROUTE .STK-MED 

ONE


   Stop: 04/01/19 07:28


Neostigmine Methylsulfate (Neostigmine) Confirm Administered Dose 5 mg .ROUTE 

.ST-MED ONE


   Stop: 04/01/19 09:33


Ondansetron HCl (Zofran)  4 mg IVPUSH ONETIME PRN


   PRN Reason: Nausea/Vomiting


   Stop: 04/01/19 10:30


Oxycodone HCl (Oxycontin)  10 mg PO ONETIME ONE


   Stop: 04/01/19 06:01


   Last Admin: 04/01/19 06:43 Dose:  10 mg


Phenylephrine HCl (Phenylephrine In Ns 100 Mcg/Ml) Confirm Administered Dose 1 

mg .ROUTE .STK-MED ONE


   Stop: 04/01/19 08:34


Phenylephrine HCl (Phenylephrine In Ns 100 Mcg/Ml) Confirm Administered Dose 1 

mg .ROUTE .ST-MED ONE


   Stop: 04/01/19 09:27


Pregabalin (Lyrica)  50 mg PO ONETIME ONE


   Stop: 04/01/19 06:01


   Last Admin: 04/01/19 06:43 Dose:  50 mg


Propofol (Diprivan  20 Ml) Confirm Administered Dose 200 mg .ROUTE .STK-MED ONE


   Stop: 04/01/19 07:44


Ropivacaine (Naropin 0.5%) Confirm Administered Dose 30 ml .ROUTE .ST-MED ONE


   Stop: 04/01/19 07:34


Tranexamic Acid (Cyklokapron) Confirm Administered Dose 1,000 mg .ROUTE .STK-

MED ONE


   Stop: 04/01/19 06:54


   Last Admin: 04/01/19 09:38 Dose:  1,000 mg


Tranexamic Acid (Cyklokapron) Confirm Administered Dose 1,000 mg .ROUTE .STK-

MED ONE


   Stop: 04/01/19 07:17


Tranexamic Acid (Cyklokapron) Confirm Administered Dose 1,000 mg .ROUTE .STK-

MED ONE


   Stop: 04/01/19 07:20


Vancomycin HCl (Vancomycin) Confirm Administered Dose 1 gm .ROUTE .STK-MED ONE


   Stop: 04/01/19 06:55


   Last Admin: 04/01/19 09:35 Dose:  1 gm

## 2019-04-02 NOTE — PCM.SURGPN
- General Info


Date of Service: 04/02/19


POD#: 1


Functional Status: Reports: Pain Controlled, Tolerating Diet, Ambulating, 

Urinating, Incentive Spirometry, Other (The pt states she is doing well.)





- Patient Data


Vitals - Most Recent: 


 Last Vital Signs











Temp  98.2 F   04/02/19 03:14


 


Pulse  94   04/02/19 03:14


 


Resp  14   04/02/19 03:14


 


BP  104/57 L  04/02/19 06:01


 


Pulse Ox  99   04/02/19 03:14











Weight - Most Recent: 130 lb


I&O - Last 24 Hours: 


 Intake & Output











 04/01/19 04/02/19 04/02/19





 22:59 06:59 14:59


 


Intake Total 180  


 


Balance 180  











Lab Results Last 24 Hrs: 


 Laboratory Results - last 24 hr











  04/02/19 04/02/19 Range/Units





  06:15 06:15 


 


WBC  7.61   (3.98-10.04)  K/mm3


 


RBC  3.75 L   (3.98-5.22)  M/mm3


 


Hgb  10.9 L   (11.2-15.7)  gm/L


 


Hct  33.5 L   (34.1-44.9)  %


 


MCV  89.3   (79.4-94.8)  fl


 


MCH  29.1   (25.6-32.2)  pg


 


MCHC  32.5   (32.2-35.5)  g/dl


 


RDW Std Deviation  46.1   (36.4-46.3)  fL


 


Plt Count  137 L   (182-369)  K/mm3


 


MPV  11.4   (9.4-12.3)  fl


 


Sodium   135 L  (136-145)  mEq/L


 


Potassium   4.1  (3.5-5.1)  mEq/L


 


Chloride   101  ()  mEq/L


 


Carbon Dioxide   26  (21-32)  mEq/L


 


Anion Gap   12.1  (5-15)  


 


BUN   21 H  (7-18)  mg/dL


 


Creatinine   0.8  (0.55-1.02)  mg/dL


 


Est Cr Clr Drug Dosing   47.17  mL/min


 


Estimated GFR (MDRD)   > 60  (>60)  mL/min


 


BUN/Creatinine Ratio   26.3 H  (14-18)  


 


Glucose   114  ()  mg/dL


 


Calcium   8.4 L  (8.5-10.1)  mg/dL


 


Total Bilirubin   0.5  (0.2-1.0)  mg/dL


 


AST   21  (15-37)  U/L


 


ALT   18  (14-59)  U/L


 


Alkaline Phosphatase   58  ()  U/L


 


Total Protein   5.5 L  (6.4-8.2)  g/dl


 


Albumin   2.9 L  (3.4-5.0)  g/dl


 


Globulin   2.6  gm/dL


 


Albumin/Globulin Ratio   1.1  (1-2)  











Med Orders - Current: 


 Current Medications





Hydrocodone Bitart/Acetaminophen (Norco 325-5 Mg)  1 - 2 tab PO Q4H PRN


   PRN Reason: Pain


   Last Admin: 04/02/19 06:02 Dose:  1 tab


Aspirin (Ecotrin)  325 mg PO DAILY [facility]


Bisacodyl (Dulcolax)  5 mg PO DAILY PRN


   PRN Reason: Constipation


Cyclobenzaprine HCl (Flexeril)  5 mg PO BID PRN


   PRN Reason: Spasms


   Last Admin: 04/01/19 22:52 Dose:  5 mg


Diltiazem HCl (Cardizem Cd)  300 mg PO DAILY [facility]


Famotidine (Pepcid)  20 mg PO Q12H [facility]


   Last Admin: 04/01/19 20:50 Dose:  20 mg


Gabapentin (Neurontin)  300 mg PO TID [facility]


   Last Admin: 04/01/19 20:50 Dose:  300 mg


Hydrochlorothiazide (Hydrochlorothiazide)  12.5 mg PO BIDDIURETIC [facility]


   Last Admin: 04/02/19 06:00 Dose:  12.5 mg


Hydroxychloroquine Sulfate (Plaquenil)  200 mg PO Q48H [facility]


Hydroxychloroquine Sulfate (Plaquenil)  400 mg PO Q48H [facility]


Lactated Ringer's (Ringers, Lactated)  1,000 mls @ 125 mls/hr IV ASDIRECTED [facility]


   Last Admin: 04/01/19 06:50 Dose:  125 mls/hr


Losartan Potassium (Cozaar)  50 mg PO BIDDIURETIC [facility]


   Last Admin: 04/02/19 06:01 Dose:  50 mg


Magnesium Hydroxide (Milk Of Magnesia)  30 ml PO BID PRN


   PRN Reason: Constipation


Morphine Sulfate (Morphine)  2 mg IVPUSH Q2H PRN


   PRN Reason: Breakthrough Pain


Naloxone HCl (Narcan)  0.1 mg IVPUSH Q5M PRN


   PRN Reason: Oversedation


Ondansetron HCl (Zofran)  4 mg IVPUSH Q6H PRN


   PRN Reason: Nausea/Vomiting


Diclofenac Sodium [ (Voltaren 1% Gel])  0 each TOP BID PRN


   PRN Reason: Pain


Senna (Senna)  8.6 mg PO BID PRN


   PRN Reason: Constipation


Sodium Chloride (Saline Flush)  10 ml FLUSH ASDIRECTED PRN


   PRN Reason: Keep Vein Open





Discontinued Medications





Acetaminophen (Tylenol)  975 mg PO ONETIME ONE


   Stop: 04/01/19 06:01


   Last Admin: 04/01/19 06:44 Dose:  975 mg


Betamethasone Valerate (Valisone 0.1% Lotion)   ml TOP ASDIRECTED PRN


   PRN Reason: Pain


Cefazolin Sodium (Ancef) Confirm Administered Dose 2 gm .ROUTE .STK-MED ONE


   Stop: 04/01/19 06:55


   Last Admin: 04/01/19 09:31 Dose:  2 gm


Cefazolin Sodium/Dextrose (Ancef) Confirm Administered Dose 2 gm IV .STK-MED ONE


   Stop: 04/01/19 14:49


   Last Admin: 04/01/19 16:33 Dose:  Not Given


Dexamethasone (Dexamethasone) Confirm Administered Dose 4 mg .ROUTE .STK-MED ONE


   Stop: 04/01/19 07:45


Diphenhydramine HCl (Benadryl)  6.25 mg IVPUSH Q6H PRN


   PRN Reason: pruritis


   Stop: 04/01/19 10:30


Ephedrine Sulfate (Ephedrine In Ns) Confirm Administered Dose 25 mg .ROUTE .STK-

MED ONE


   Stop: 04/01/19 09:09


Epinephrine HCl (Adrenalin) Confirm Administered Dose 1 mg .ROUTE .STK-MED ONE


   Stop: 04/01/19 07:34


Fentanyl (Sublimaze) Confirm Administered Dose 250 mcg .ROUTE .STK-MED ONE


   Stop: 04/01/19 07:28


Fentanyl (Sublimaze)  25 mcg IVPUSH Q5M PRN


   PRN Reason: Pain


   Stop: 04/01/19 10:30


Glycopyrrolate (Robinul) Confirm Administered Dose 0.2 mg .ROUTE .STK-MED ONE


   Stop: 04/01/19 09:33


Cefazolin Sodium/Dextrose 2 gm (/ Premix)  50 mls @ 100 mls/hr IV Q8H [facility]


   Stop: 04/02/19 07:29


   Last Admin: 04/02/19 06:50 Dose:  100 mls/hr


Lidocaine HCl (Xylocaine-Mpf 1%) Confirm Administered Dose 4 mls @ as directed 

.ROUTE .STK-MED ONE


   Stop: 04/01/19 07:34


Lidocaine HCl (Xylocaine-Mpf 1%) Confirm Administered Dose 4 mls @ as directed 

.ROUTE .STK-MED ONE


   Stop: 04/01/19 07:45


Lactated Ringer's (Ringers, Lactated) Confirm Administered Dose 1,000 mls @ as 

directed .ROUTE .STK-MED ONE


   Stop: 04/01/19 09:32


Iodine (Iodine 2% Mild Tincture) Confirm Administered Dose 30 ml .ROUTE .STK-

MED ONE


   Stop: 04/01/19 06:55


   Last Admin: 04/01/19 09:28 Dose:  18 ml


Ketorolac Tromethamine (Toradol)  15 mg IVPUSH Q6H PRN


   PRN Reason: Pain


   Last Admin: 04/02/19 03:05 Dose:  15 mg


Lidocaine/Sodium Bicarbonate (Buffered Lidocaine 1% In Ns 8.4%)  0.25 ml IDERM 

ONETIME PRN


   PRN Reason: Prior to IV Start


   Last Admin: 04/01/19 06:50 Dose:  0.25 ml


Midazolam HCl (Versed 1 Mg/Ml) Confirm Administered Dose 2 mg .ROUTE .ST-MED 

ONE


   Stop: 04/01/19 07:28


Neostigmine Methylsulfate (Neostigmine) Confirm Administered Dose 5 mg .ROUTE 

.STK-MED ONE


   Stop: 04/01/19 09:33


Ondansetron HCl (Zofran)  4 mg IVPUSH ONETIME PRN


   PRN Reason: Nausea/Vomiting


   Stop: 04/01/19 10:30


Oxycodone HCl (Oxycontin)  10 mg PO ONETIME ONE


   Stop: 04/01/19 06:01


   Last Admin: 04/01/19 06:43 Dose:  10 mg


Phenylephrine HCl (Phenylephrine In Ns 100 Mcg/Ml) Confirm Administered Dose 1 

mg .ROUTE .STK-MED ONE


   Stop: 04/01/19 08:34


Phenylephrine HCl (Phenylephrine In Ns 100 Mcg/Ml) Confirm Administered Dose 1 

mg .ROUTE .STK-MED ONE


   Stop: 04/01/19 09:27


Pregabalin (Lyrica)  50 mg PO ONETIME ONE


   Stop: 04/01/19 06:01


   Last Admin: 04/01/19 06:43 Dose:  50 mg


Propofol (Diprivan  20 Ml) Confirm Administered Dose 200 mg .ROUTE .STK-MED ONE


   Stop: 04/01/19 07:44


Ropivacaine (Naropin 0.5%) Confirm Administered Dose 30 ml .ROUTE .STK-MED ONE


   Stop: 04/01/19 07:34


Tranexamic Acid (Cyklokapron) Confirm Administered Dose 1,000 mg .ROUTE .STK-

MED ONE


   Stop: 04/01/19 06:54


   Last Admin: 04/01/19 09:38 Dose:  1,000 mg


Tranexamic Acid (Cyklokapron) Confirm Administered Dose 1,000 mg .ROUTE .STK-

MED ONE


   Stop: 04/01/19 07:17


Tranexamic Acid (Cyklokapron) Confirm Administered Dose 1,000 mg .ROUTE .STK-

MED ONE


   Stop: 04/01/19 07:20


Vancomycin HCl (Vancomycin) Confirm Administered Dose 1 gm .ROUTE .STK-MED ONE


   Stop: 04/01/19 06:55


   Last Admin: 04/01/19 09:35 Dose:  1 gm











- Exam


Wound/Incisions: Dressing Dry and Intact


General: Alert, Cooperative, No Acute Distress


Lungs: Normal Respiratory Effort


Extremities: Other (NVS intact for LUE.  Taisha's negative.)





- Problem List Review


Problem List Initiated/Reviewed/Updated: Yes





- My Orders


Last 24 Hours: 


 Active Orders 24 hr











 Category Date Time Status


 


 Notify Provider [RC] ASDIRECTED Care  04/01/19 07:39 Active


 


 Ready for Discharge [RC] PER UNIT ROUTINE Care  04/02/19 08:21 Active


 


 Regular Diet [DIET] Diet  04/01/19 Dinner Active


 


 Aspirin [Ecotrin] Med  04/02/19 09:00 Active





 325 mg PO DAILY   


 


 Diltiazem [Cardizem CD] Med  04/02/19 09:00 Active





 300 mg PO DAILY   


 


 Famotidine [Pepcid] Med  04/01/19 21:00 Active





 20 mg PO Q12H   


 


 Gabapentin [Neurontin] Med  04/01/19 15:00 Active





 300 mg PO TID   


 


 Hydroxychloroquine [Plaquenil] Med  04/01/19 13:00 Pending





 200 mg PO Q48H   


 


 Hydroxychloroquine [Plaquenil] Med  04/01/19 13:00 Pending





 400 mg PO Q48H   


 


 Losartan [Cozaar] Med  04/01/19 14:00 Active





 50 mg PO BIDDIURETIC   


 


 Magnesium Hydroxide [Milk of Magnesia] Med  04/01/19 21:00 Active





 30 ml PO BID PRN   


 


 Patient's Own Medication [Ptom] Med  04/01/19 12:52 Active





 0 each TOP BID PRN   


 


 hydroCHLOROthiazide Med  04/01/19 14:00 Active





 12.5 mg PO BIDDIURETIC   








 Medication Orders





Hydrocodone Bitart/Acetaminophen (Norco 325-5 Mg)  1 - 2 tab PO Q4H PRN


   PRN Reason: Pain


   Last Admin: 04/02/19 06:02  Dose: 1 tab


Aspirin (Ecotrin)  325 mg PO DAILY [facility]


Bisacodyl (Dulcolax)  5 mg PO DAILY PRN


   PRN Reason: Constipation


Cyclobenzaprine HCl (Flexeril)  5 mg PO BID PRN


   PRN Reason: Spasms


   Last Admin: 04/01/19 22:52  Dose: 5 mg


Diltiazem HCl (Cardizem Cd)  300 mg PO DAILY [facility]


Famotidine (Pepcid)  20 mg PO Q12H [facility]


   Last Admin: 04/01/19 20:50  Dose: 20 mg


Gabapentin (Neurontin)  300 mg PO TID [facility]


   Last Admin: 04/01/19 20:50  Dose: 300 mg


   Admin: 04/01/19 14:53  Dose: 300 mg


Hydrochlorothiazide (Hydrochlorothiazide)  12.5 mg PO BIDDIURETIC [facility]


   Last Admin: 04/02/19 06:00  Dose: 12.5 mg


   Admin: 04/01/19 14:53  Dose: 12.5 mg


Hydroxychloroquine Sulfate (Plaquenil)  200 mg PO Q48H [facility]


Hydroxychloroquine Sulfate (Plaquenil)  400 mg PO Q48H [facility]


Lactated Ringer's (Ringers, Lactated)  1,000 mls @ 125 mls/hr IV ASDIRECTED [facility]


   Last Admin: 04/01/19 06:50  Dose: 125 mls/hr


Losartan Potassium (Cozaar)  50 mg PO BIDDIURETIC [facility]


   Last Admin: 04/02/19 06:01  Dose: 50 mg


   Admin: 04/01/19 14:54  Dose: 50 mg


Magnesium Hydroxide (Milk Of Magnesia)  30 ml PO BID PRN


   PRN Reason: Constipation


Morphine Sulfate (Morphine)  2 mg IVPUSH Q2H PRN


   PRN Reason: Breakthrough Pain


Naloxone HCl (Narcan)  0.1 mg IVPUSH Q5M PRN


   PRN Reason: Oversedation


Ondansetron HCl (Zofran)  4 mg IVPUSH Q6H PRN


   PRN Reason: Nausea/Vomiting


Diclofenac Sodium [ (Voltaren 1% Gel])  0 each TOP BID PRN


   PRN Reason: Pain


Senna (Senna)  8.6 mg PO BID PRN


   PRN Reason: Constipation


Sodium Chloride (Saline Flush)  10 ml FLUSH ASDIRECTED PRN


   PRN Reason: Keep Vein Open











- Assessment


Assessment           (Free Text/Narrative):: 





POD#1 - left reverse TSA





- Plan


Plan                        (Free Text/Narrative):: 





1.  Hgb 10.9.


2.  Discharge to home today.


3.  Pt advised to HOLD use of azothiaprine x 3 days post-op.


4.  Pt agreeable to increase ASA to 325mg at this time.


5.  Outpatient therapy.





The pt's case was discussed with Dr. Renae.

## 2019-04-04 NOTE — OR
DATE OF OPERATION:  04/01/2019

 

SURGEON:  Claude Renae MD

 

OPERATION PERFORMED:  Left reverse total shoulder arthroplasty.

 

PREOPERATIVE DIAGNOSIS:

Left shoulder rotator cuff tear arthropathy.

 

POSTOPERATIVE DIAGNOSIS:

Left shoulder rotator cuff tear arthropathy.

 

ANESTHESIA:

General endotracheal intubation with regional interscalene block.

 

ANESTHESIA PROVIDER:

Elle Liang CRNA

 

ASSISTANT:

1. Gale Lane PA-C.

2. Lexie Crawford LPN.

 

ESTIMATED BLOOD LOSS:

200 mL.

 

COMPLICATIONS:

None.

 

CONDITION:

Stable.

 

DESCRIPTION OF PROCEDURE:

The patient was identified in the preop holding area.  Proper site was marked

and identified by the surgeon.  The patient was taken back to the operating

theater where after adequate anesthesia, the patient was placed supine on a

radiolucent flat top table.  The patient was secured to the table.  At this

time, the left upper extremity was sterilely prepped and draped in the usual

sterile fashion.  OR time-out was performed.  The patient received 2 g IV Ancef.

At this time, the patient was placed in a reverse Trendelenburg position and a

deltopectoral incision was made.  This was taken down to the cephalic vein.  The

cephalic vein was identified.  Deltopectoral interval was then found.  A Fukuda

retractor was placed out laterally at the subdeltoid space.  All subdeltoid as

well as subacromial adhesions were resected at this time.  The clavipectoral

fascia was incised, and the conjoint tendon was retracted medially.  The

anterior humeral circumflex vessels were identified and were ligated using 0

Vicryl stick tie.  The biceps tendon was identified and subpectoral tenodesis

was then performed.  The biceps tendon interval was then opened all the way back

to the level of the glenoid and the biceps tendon was resected.  Peel down of

the subscapularis tendon was then done and the humeral head was identified.  The

neck cut was then completed and attention was turned to the glenoid.  Posterior

as well as anterior retractors were then placed and circumferential removal of

the capsule as well as the labrum was done from around the glenoid.  At this

time, a guide pin was placed in a center-center position.  The central reamer

hole for a small base plate was then done and then the peripheral reamer for a

small base plate was done.  There was found to have adequate placement.  The

small glenoid baseplate was then impacted into place and a central nonlocking

compression screw was placed inferior and then superior locking screws were then

placed in divergent fashion.  Attention was then turned back to the humerus.  I

was able to broach up to a size 5 which was found to be rotationally and

vertically stable.  The trial components were 135 degrees.  They were assembled

on the back table.  The 36, +4 mm glenosphere was then impacted into place, and

we trialed the humeral components.  A +3 was noted to have just a slight amount

of laxity on the conjoined tendon, so +6 was found to have adequate restoration

of tension on both the deltoid as well as the conjoined tendon with no signs of

instability.  At this time, the size 5 stem with 135 degrees for small base

plate was then constructed on the back table.  A +6 mm poly was then impacted

into place and the whole construct was impacted in the humerus.  The patient's

humerus was then CR of the patient's glenohumeral joint was then relocated.  C-

arm fluoroscopy was utilized to show that all components were in place.  1 L

dilute Betadine solution was irrigated through the shoulder along with 3 L pulse

lavage irrigation with Ancef.  Topical tranexamic acid as well as vancomycin

powder was placed.  #2 FiberWire was used for closure of the rotator interval, 2
-

0 Vicryl was used subcutaneously.  Prineo was used for the skin.  The patient

tolerated the procedure well and sent to PACU in stable condition.

 

DD:  04/04/2019 07:14:16

DT:  04/04/2019 08:03:31  [facility]

Job #:  [id number]

ABISAI

## 2019-04-04 NOTE — PCM.OPNOTE
- General Post-Op/Procedure Note


Date of Surgery/Procedure: 04/01/19


Operative Procedure(s): left reverse total shoulder arthroplasty


Pre Op Diagnosis: left shoulder rotator cuff tear arthropathy


Post-Op Diagnosis: Same


Anesthesia Technique: General ET Tube, Regional Block


Primary Surgeon: Claude Renae


Anesthesia Provider: Elle Liang


Assistant: Gale Lane


Assistant: Lexie Crawford


EBL in mLs: 200


Complications: None


Condition: Good


Free Text/Narrative:: 





size 36+4


small baseplate


size 5 stem


6mm poly

## 2020-07-08 NOTE — EDM.PDOC
ED HPI GENERAL MEDICAL PROBLEM





- General


Chief Complaint: Allergic Reaction


Stated Complaint: BUG BITE RIGHT ARM


Time Seen by Provider: 07/08/20 21:07


Source of Information: Reports: Patient, RN Notes Reviewed


History Limitations: Reports: No Limitations





- History of Present Illness


INITIAL COMMENTS - FREE TEXT/NARRATIVE: 





Patient is an 79-year-old female who presents to the ED for the evaluation of 

bee or wasp sting.  She believes she is having allergic reaction, and she states

there is redness spreading up her arm, and onto her abdomen.  She thinks she was

stung by this be a little before 7 PM.  She did note that her right forearm was 

having pain, her skin is slightly itchy, and again the redness does seem to be 

spreading up her arm and onto her right side of her chest and abdomen patient 

states that she became sweaty, and then felt a little bit lightheaded and states

that she "passed out" in the chair right after the sting happened.  She denies 

any shortness of breath, swelling to the face or throat, or any other sick-like 

symptoms, fever/chills, nausea/vomiting/diarrhea.  She did take 1 tablet of 

Benadryl for management, and states it did not really relieve much of her 

symptoms.





- Related Data


                                    Allergies











Allergy/AdvReac Type Severity Reaction Status Date / Time


 


ACE Inhibitors Allergy  Cannot Verified 07/08/20 20:07





   Remember  


 


amoxicillin Allergy  Cannot Verified 07/08/20 20:07





   Remember  


 


Sulfa (Sulfonamide Allergy  Cannot Verified 07/08/20 20:07





Antibiotics)   Remember  











Home Meds: 


                                    Home Meds





Gabapentin [Neurontin] 300 mg PO TID 10/27/16 [History]


Hydroxychloroquine [Plaquenil] 400 mg PO Q48H 10/27/16 [History]


Losartan/Hydrochlorothiazide [Losartan-HCTZ 50-12.5 MG] 1 tab PO BID 10/27/16 

[History]


Betamethasone Valerate 1 applic TP ASDIRECTED PRN 07/28/17 [History]


Lysine 500 mg PO DAILY 07/28/17 [History]


Acetaminophen [Tylenol] 650 mg PO Q4H PRN 02/15/19 [History]


Hydroxychloroquine [Plaquenil] 400 mg PO DAILY 02/15/19 [History]


dilTIAZem HCL [Diltiazem 24Hr ER (Cd)] 300 mg PO DAILY 02/15/19 [History]


Sennosides [Senna] 8.6 mg PO BID PRN  tablet 04/02/19 [Rx]


Aspirin 81 mg PO DAILY 07/08/20 [History]


Biotin 5,000 mcg PO DAILY 07/08/20 [History]


Cranberry 400 mg PO DAILY 07/08/20 [History]


FA/Lycopene/Lut/MV,Ca,Iron,Min [Centrum] 1 tab PO DAILY 07/08/20 [History]


azaTHIOprine [Imuran] 50 mg PO BID 07/08/20 [History]


predniSONE 20 mg PO ASDIRECTED #15 tab 07/08/20 [Rx]











Past Medical History


HEENT History: Reports: Impaired Vision, Macular Degeneration, Other (See Below)


Other HEENT History: has glasses, dentures


Cardiovascular History: Reports: Hypertension


Other Cardiovascular History: Dependent edema


Gastrointestinal History: Reports: Colon Polyp


Musculoskeletal History: Reports: Osteoarthritis, Other (See Below)


Other Musculoskeletal History: left shoulder pain


Neurological History: Reports: Other (See Below)


Other Neuro History: Mild kyphosis


Endocrine/Metabolic History: Reports: Osteopenia


Other Endocrine/Metabolic History: Lupus


Hematologic History: Reports: Other (See Below)


Other Hematologic History: thrombocytopenia, pancytopenia


Immunologic History: Reports: SLE


Other Immunologic History: Lupus


Dermatologic History: Reports: Other (See Below)


Other Dermatologic History: Dry skin





- Past Surgical History


GI Surgical History: Reports: Colonoscopy


Female  Surgical History: Reports: D&C


Musculoskeletal Surgical History: Reports: Carpal Tunnel, Knee Replacement


Oncologic Surgical History: Reports: Bone Marrow Aspiration, Other (See Below)


Other Oncologic Surgeries/Procedures: lymph node biopsy





Social & Family History





- Family History


Family Medical History: Noncontributory





- Tobacco Use


Smoking Status *Q: Never Smoker





- Caffeine Use


Caffeine Use: Reports: Coffee


Caffeine Use Comment: for breakfast





- Recreational Drug Use


Recreational Drug Use: No





ED ROS ALLERGIC REACTION





- Review of Systems


Review Of Systems: Comprehensive ROS is negative, except as noted in HPI.





ED EXAM GENERAL NO PERIP PULSE





- Physical Exam


Exam: See Below


Exam Limited By: No Limitations


General Appearance: Alert, WD/WN, No Apparent Distress


Throat/Mouth: Normal Inspection, Normal Lips, Normal Teeth, Normal Gums, Normal 

Oropharynx, Normal Voice, No Airway Compromise


Respiratory/Chest: No Respiratory Distress, Lungs Clear, Normal Breath Sounds, 

No Accessory Muscle Use, Chest Non-Tender


Cardiovascular: Normal Peripheral Pulses, Regular Rate, Rhythm, No Edema, No 

Murmur


GI/Abdominal: Normal Bowel Sounds, Soft, Non-Tender, No Distention, No Mass


Extremities: Normal Range of Motion, Normal Capillary Refill


Neurological: Alert, Oriented, Normal Cognition, No Motor/Sensory Deficits


Psychiatric: Normal Affect, Normal Mood


Skin Exam: Warm, Dry, Intact, No Rash, Erythema (Patient does have an area that 

does look like a wasp sting on her right inner forearm, but there is redness 

extending up the forearm, up the inner arm, anterior shoulder, over the anterior

 right chest, and onto the patient's abdomen.  This is slightly itchy, and also 

a little tender to the touch.)





Course





- Vital Signs


Last Recorded V/S: 


                                Last Vital Signs











Temp  98 F   07/08/20 20:04


 


Pulse  95   07/08/20 20:04


 


Resp  16   07/08/20 20:04


 


BP  125/57 L  07/08/20 20:04


 


Pulse Ox  96   07/08/20 20:04














- Orders/Labs/Meds


Orders: 


                               Active Orders 24 hr











 Category Date Time Status


 


 Peripheral IV Care [RC] .AS DIRECTED Care  07/08/20 21:18 Ordered


 


 Sodium Chloride 0.9% [Normal Saline] 1,000 ml Med  07/08/20 21:18 Ordered





 IV ONETIME   


 


 Sodium Chloride 0.9% [Saline Flush] Med  07/08/20 21:18 Ordered





 10 ml FLUSH ASDIRECTED PRN   


 


 Peripheral IV Insertion Adult [OM.PC] Stat Oth  07/08/20 21:18 Ordered








                                Medication Orders





Sodium Chloride (Normal Saline)  1,000 mls @ 500 mls/hr IV ONETIME ONE


   Stop: 07/08/20 23:17


   Last Admin: 07/08/20 21:42  Dose: 500 mls/hr


   Documented by: HERMMIC


Sodium Chloride (Saline Flush)  10 ml FLUSH ASDIRECTED PRN


   PRN Reason: Keep Vein Open


   Last Admin: 07/08/20 22:08 Dose:  10 ml


   Documented by: 








Meds: 


Medications











Generic Name Dose Route Start Last Admin





  Trade Name Freq  PRN Reason Stop Dose Admin


 


Sodium Chloride  1,000 mls @ 500 mls/hr  07/08/20 21:18  07/08/20 21:42





  Normal Saline  IV  07/08/20 23:17  500 mls/hr





  ONETIME ONE   Administration


 


Sodium Chloride  10 ml  07/08/20 21:18  07/08/20 22:08





  Saline Flush  FLUSH   10 ml





  ASDIRECTED PRN   Administration





  Keep Vein Open  














Discontinued Medications














Generic Name Dose Route Start Last Admin





  Trade Name Tasia  PRN Reason Stop Dose Admin


 


Diphenhydramine HCl  25 mg  07/08/20 21:18  07/08/20 21:43





  Benadryl  IVPUSH  07/08/20 21:19  25 mg





  ONETIME ONE   Administration


 


Famotidine  20 mg  07/08/20 21:18  07/08/20 21:44





  Pepcid  IVPUSH  07/08/20 21:19  20 mg





  ONETIME ONE   Administration


 


Methylprednisolone Sodium Succinate  125 mg  07/08/20 21:18  07/08/20 21:44





  Solu-Medrol  IVPUSH  07/08/20 21:19  125 mg





  ONETIME ONE   Administration














- Re-Assessments/Exams


Free Text/Narrative Re-Assessment/Exam: 





07/08/20 21:51


Patient does appear to be having more of a systemic inflammatory reaction, on 

her skin.  She will have an IV placed, 125 mg Solu-Medrol, some famotidine, and 

25 mg IV Benadryl for further management.  Will likely send her home with a 

course of prednisone for ongoing allergic-like reaction.





07/08/20 22:52


Was reassessed at bedside, and her redness has decreased substantially.  

Everything looks much better, patient is ready go home at this time.  We will 

discharge her home with general recommendations.





Departure





- Departure


Time of Disposition: 22:53


Disposition: Home, Self-Care 01


Condition: Good


Clinical Impression: 


Allergic reaction


Qualifiers:


 Encounter type: initial encounter Qualified Code(s): T78.40XA - Allergy, 

unspecified, initial encounter





Bee sting reaction


Qualifiers:


 Encounter type: initial encounter Injury intent: accidental or unintentional 

Qualified Code(s): T63.441A - Toxic effect of venom of bees, accidental 

(unintentional), initial encounter








- Discharge Information


*PRESCRIPTION DRUG MONITORING PROGRAM REVIEWED*: No


*COPY OF PRESCRIPTION DRUG MONITORING REPORT IN PATIENT SENDY: No


Prescriptions: 


predniSONE 20 mg PO ASDIRECTED #15 tab


Instructions:  Bee, Wasp, or Hornet Sting, Adult


Referrals: 


Lyndsey Ding MD [Primary Care Provider] - 


Forms:  ED Department Discharge


Additional Instructions: 


You were seen in the ER today regarding your bee or wasp sting.





You did seem to have a pretty severe inflammatory reaction.  You were given IV 

fluids, and some other IV medications to help relieve some of the symptoms, this

did seem to help quite a bit.





You were given a prescription for prednisone, please take as directed for 

further allergic-like symptoms.  You may also try 1 tablet of Benadryl, every 4-

6 hours as needed for further relief of symptoms.





The medication was electronically prescribed to the [facility] pharmacy 

located on [address], you will need to go there tomorrow during business hours to 

obtain and take as prescribed.





Please return to the ER at any time if symptoms change or worsen.





Sepsis Event Note (ED)





- Evaluation


Sepsis Screening Result: No Definite Risk





- Focused Exam


Vital Signs: 


                                   Vital Signs











  Temp Pulse Resp BP Pulse Ox


 


 07/08/20 20:04  98 F  95  16  125/57 L  96














- My Orders


Last 24 Hours: 


My Active Orders





07/08/20 21:18


Peripheral IV Care [RC] .AS DIRECTED 


Sodium Chloride 0.9% [Normal Saline] 1,000 ml IV ONETIME 


Sodium Chloride 0.9% [Saline Flush]   10 ml FLUSH ASDIRECTED PRN 


Peripheral IV Insertion Adult [OM.PC] Stat 














- Assessment/Plan


Last 24 Hours: 


My Active Orders





07/08/20 21:18


Peripheral IV Care [RC] .AS DIRECTED 


Sodium Chloride 0.9% [Normal Saline] 1,000 ml IV ONETIME 


Sodium Chloride 0.9% [Saline Flush]   10 ml FLUSH ASDIRECTED PRN 


Peripheral IV Insertion Adult [OM.PC] Stat

## 2022-01-26 NOTE — PCM.CONSN
- General Info


Date of Service: 04/02/19


Admission Dx/Problem (Free Text): 


 Admission Diagnosis/Problem





Admission Diagnosis/Problem      Osteoarthritis of shoulder








Subjective Update: 


In to see Saray. She is just finishing up with OT. She is doing well and has 

no concerns. No nursing concerns. 





Functional Status: Reports: Pain Controlled, Tolerating Diet, Ambulating, 

Urinating, Incentive Spirometry.  Denies: New Symptoms





- Review of Systems


General: Reports: No Symptoms.  Denies: Fever, Weakness, Fatigue, Malaise, 

Chills


HEENT: Reports: No Symptoms.  Denies: Headaches, Sore Throat


Pulmonary: Reports: No Symptoms.  Denies: Shortness of Breath, Pleuritic Chest 

Pain, Cough, Sputum, Wheezing


Cardiovascular: Reports: No Symptoms.  Denies: Chest Pain, Palpitations, 

Dyspnea on Exertion


Gastrointestinal: Reports: No Symptoms.  Denies: Abdominal Pain, Constipation, 

Diarrhea, Nausea, Vomiting


Genitourinary: Reports: No Symptoms.  Denies: Pain


Musculoskeletal: Reports: Shoulder Pain


Skin: Reports: No Symptoms.  Denies: Cyanosis


Neurological: Reports: No Symptoms.  Denies: Confusion


Psychiatric: Reports: No Symptoms





- Patient Data


Vitals - Most Recent: 


 Last Vital Signs











Temp  98.2 F   04/02/19 03:14


 


Pulse  94   04/02/19 03:14


 


Resp  14   04/02/19 03:14


 


BP  104/57 L  04/02/19 06:01


 


Pulse Ox  99   04/02/19 03:14











Weight - Most Recent: 130 lb


I&O - Last 24 Hours: 


 Intake & Output











 04/01/19 04/01/19 04/02/19





 14:59 22:59 06:59


 


Intake Total 180 180 


 


Output Total 700  


 


Balance -520 180 











Med Orders - Current: 


 Current Medications





Hydrocodone Bitart/Acetaminophen (Norco 325-5 Mg)  1 - 2 tab PO Q4H PRN


   PRN Reason: Pain


   Last Admin: 04/02/19 06:02 Dose:  1 tab


Aspirin (Ecotrin)  325 mg PO DAILY [facility]


Bisacodyl (Dulcolax)  5 mg PO DAILY PRN


   PRN Reason: Constipation


Cyclobenzaprine HCl (Flexeril)  5 mg PO BID PRN


   PRN Reason: Spasms


   Last Admin: 04/01/19 22:52 Dose:  5 mg


Diltiazem HCl (Cardizem Cd)  300 mg PO DAILY [facility]


Famotidine (Pepcid)  20 mg PO Q12H [facility]


   Last Admin: 04/01/19 20:50 Dose:  20 mg


Gabapentin (Neurontin)  300 mg PO TID [facility]


   Last Admin: 04/01/19 20:50 Dose:  300 mg


Hydrochlorothiazide (Hydrochlorothiazide)  12.5 mg PO BIDDIURETIC [facility]


   Last Admin: 04/02/19 06:00 Dose:  12.5 mg


Hydroxychloroquine Sulfate (Plaquenil)  200 mg PO Q48H [facility]


Hydroxychloroquine Sulfate (Plaquenil)  400 mg PO Q48H [facility]


Lactated Ringer's (Ringers, Lactated)  1,000 mls @ 125 mls/hr IV ASDIRECTED [facility]


   Last Admin: 04/01/19 06:50 Dose:  125 mls/hr


Cefazolin Sodium/Dextrose 2 gm (/ Premix)  50 mls @ 100 mls/hr IV Q8H [facility]


   Stop: 04/02/19 07:29


   Last Admin: 04/02/19 06:50 Dose:  100 mls/hr


Losartan Potassium (Cozaar)  50 mg PO BIDDIURETIC [facility]


   Last Admin: 04/02/19 06:01 Dose:  50 mg


Magnesium Hydroxide (Milk Of Magnesia)  30 ml PO BID PRN


   PRN Reason: Constipation


Morphine Sulfate (Morphine)  2 mg IVPUSH Q2H PRN


   PRN Reason: Breakthrough Pain


Naloxone HCl (Narcan)  0.1 mg IVPUSH Q5M PRN


   PRN Reason: Oversedation


Ondansetron HCl (Zofran)  4 mg IVPUSH Q6H PRN


   PRN Reason: Nausea/Vomiting


Diclofenac Sodium [ (Voltaren 1% Gel])  0 each TOP BID PRN


   PRN Reason: Pain


Senna (Senna)  8.6 mg PO BID PRN


   PRN Reason: Constipation


Sodium Chloride (Saline Flush)  10 ml FLUSH ASDIRECTED PRN


   PRN Reason: Keep Vein Open





Discontinued Medications





Acetaminophen (Tylenol)  975 mg PO ONETIME ONE


   Stop: 04/01/19 06:01


   Last Admin: 04/01/19 06:44 Dose:  975 mg


Betamethasone Valerate (Valisone 0.1% Lotion)   ml TOP ASDIRECTED PRN


   PRN Reason: Pain


Cefazolin Sodium (Ancef) Confirm Administered Dose 2 gm .ROUTE .STK-MED ONE


   Stop: 04/01/19 06:55


   Last Admin: 04/01/19 09:31 Dose:  2 gm


Cefazolin Sodium/Dextrose (Ancef) Confirm Administered Dose 2 gm IV .STK-MED ONE


   Stop: 04/01/19 14:49


   Last Admin: 04/01/19 16:33 Dose:  Not Given


Dexamethasone (Dexamethasone) Confirm Administered Dose 4 mg .ROUTE .STK-MED ONE


   Stop: 04/01/19 07:45


Diphenhydramine HCl (Benadryl)  6.25 mg IVPUSH Q6H PRN


   PRN Reason: pruritis


   Stop: 04/01/19 10:30


Ephedrine Sulfate (Ephedrine In Ns) Confirm Administered Dose 25 mg .ROUTE .STK-

MED ONE


   Stop: 04/01/19 09:09


Epinephrine HCl (Adrenalin) Confirm Administered Dose 1 mg .ROUTE .STK-MED ONE


   Stop: 04/01/19 07:34


Fentanyl (Sublimaze) Confirm Administered Dose 250 mcg .ROUTE .STK-MED ONE


   Stop: 04/01/19 07:28


Fentanyl (Sublimaze)  25 mcg IVPUSH Q5M PRN


   PRN Reason: Pain


   Stop: 04/01/19 10:30


Glycopyrrolate (Robinul) Confirm Administered Dose 0.2 mg .ROUTE .STK-MED ONE


   Stop: 04/01/19 09:33


Lidocaine HCl (Xylocaine-Mpf 1%) Confirm Administered Dose 4 mls @ as directed 

.ROUTE .STK-MED ONE


   Stop: 04/01/19 07:34


Lidocaine HCl (Xylocaine-Mpf 1%) Confirm Administered Dose 4 mls @ as directed 

.ROUTE .STK-MED ONE


   Stop: 04/01/19 07:45


Lactated Ringer's (Ringers, Lactated) Confirm Administered Dose 1,000 mls @ as 

directed .ROUTE .STK-MED ONE


   Stop: 04/01/19 09:32


Iodine (Iodine 2% Mild Tincture) Confirm Administered Dose 30 ml .ROUTE .STK-

MED ONE


   Stop: 04/01/19 06:55


   Last Admin: 04/01/19 09:28 Dose:  18 ml


Ketorolac Tromethamine (Toradol)  15 mg IVPUSH Q6H PRN


   PRN Reason: Pain


   Last Admin: 04/02/19 03:05 Dose:  15 mg


Lidocaine/Sodium Bicarbonate (Buffered Lidocaine 1% In Ns 8.4%)  0.25 ml IDERM 

ONETIME PRN


   PRN Reason: Prior to IV Start


   Last Admin: 04/01/19 06:50 Dose:  0.25 ml


Midazolam HCl (Versed 1 Mg/Ml) Confirm Administered Dose 2 mg .ROUTE .STK-MED 

ONE


   Stop: 04/01/19 07:28


Neostigmine Methylsulfate (Neostigmine) Confirm Administered Dose 5 mg .ROUTE 

.STK-MED ONE


   Stop: 04/01/19 09:33


Ondansetron HCl (Zofran)  4 mg IVPUSH ONETIME PRN


   PRN Reason: Nausea/Vomiting


   Stop: 04/01/19 10:30


Oxycodone HCl (Oxycontin)  10 mg PO ONETIME ONE


   Stop: 04/01/19 06:01


   Last Admin: 04/01/19 06:43 Dose:  10 mg


Phenylephrine HCl (Phenylephrine In Ns 100 Mcg/Ml) Confirm Administered Dose 1 

mg .ROUTE .STK-MED ONE


   Stop: 04/01/19 08:34


Phenylephrine HCl (Phenylephrine In Ns 100 Mcg/Ml) Confirm Administered Dose 1 

mg .ROUTE .STK-MED ONE


   Stop: 04/01/19 09:27


Pregabalin (Lyrica)  50 mg PO ONETIME ONE


   Stop: 04/01/19 06:01


   Last Admin: 04/01/19 06:43 Dose:  50 mg


Propofol (Diprivan  20 Ml) Confirm Administered Dose 200 mg .ROUTE .STK-MED ONE


   Stop: 04/01/19 07:44


Ropivacaine (Naropin 0.5%) Confirm Administered Dose 30 ml .ROUTE .STK-MED ONE


   Stop: 04/01/19 07:34


Tranexamic Acid (Cyklokapron) Confirm Administered Dose 1,000 mg .ROUTE .STK-

MED ONE


   Stop: 04/01/19 06:54


   Last Admin: 04/01/19 09:38 Dose:  1,000 mg


Tranexamic Acid (Cyklokapron) Confirm Administered Dose 1,000 mg .ROUTE .STK-

MED ONE


   Stop: 04/01/19 07:17


Tranexamic Acid (Cyklokapron) Confirm Administered Dose 1,000 mg .ROUTE .STK-

MED ONE


   Stop: 04/01/19 07:20


Vancomycin HCl (Vancomycin) Confirm Administered Dose 1 gm .ROUTE .Tabtor-MED ONE


   Stop: 04/01/19 06:55


   Last Admin: 04/01/19 09:35 Dose:  1 gm











- Exam


Quality Assessment: DVT Prophylaxis


General: Alert, Oriented, Cooperative, No Acute Distress


HEENT: Pupils Equal, Pupils Reactive, EOMI, Mucous Membr. Moist/Pink


Neck: Supple


Lungs: Clear to Auscultation, Normal Respiratory Effort


Cardiovascular: Regular Rate, Regular Rhythm


GI/Abdominal Exam: Normal Bowel Sounds, Soft, Non-Tender, No Organomegaly, No 

Distention


 (Female) Exam: Deferred


Back Exam: Normal Inspection, Full Range of Motion


Extremities: No Pedal Edema, Normal Capillary Refill, Arm Pain (right shoulder )

, Limited Range of Motion, Other (Sling and bandage in place on left shoulder. 

Cooling pack in place. )


Peripheral Pulses: 2+: Radial (L), Radial (R), Dorsalis Pedis (L), Dorsalis 

Pedis (R)


Skin: Warm, Dry, Intact


Wound/Incisions: Dressing Dry and Intact, No Drainage


Neurological: No New Focal Deficit


Psy/Mental Status: Alert, Normal Affect, Normal Mood





Consult PN Assessment/Plan


POD#: 1


Procedures: 


Procedures





ARTHROSCOP ROTATOR CUFF REPR (02/18/19)


BONE IMAGING 3 PHASE (05/08/17)


COMPLETE CBC W/AUTO DIFF WBC (07/31/17)


COMPREHEN METABOLIC PANEL (07/31/17)


DRAIN/INJ JOINT/BURSA W/O US (12/13/16)


DXA BONE DENSITY AXIAL (06/07/17)


EMERGENCY DEPT VISIT (10/27/16)


EMERGENCY DEPT VISIT (06/10/15)


GAIT TRAINING THERAPY (07/31/17)


MEASURE BLOOD OXYGEN LEVEL (07/31/17)


MR-STAPH DNA AMP PROBE (02/06/19)


MRI JOINT UPR EXTREM W/O DYE (12/20/18)


N BLOCK INJ BRACHIAL PLEXUS (02/18/19)


OT EVAL LOW COMPLEX 30 MIN (07/31/17)


PT EVAL MOD COMPLEX 30 MIN (07/31/17)


ROUTINE VENIPUNCTURE (07/31/17)


SELF CARE MNGMENT TRAINING (07/31/17)


SHOULDER ARTHROSCOPY/SURGERY (02/18/19)


THERAPEUTIC ACTIVITIES (07/31/17)


THERAPEUTIC EXERCISES (07/31/17)


X-RAY EXAM OF ANKLE (06/10/15)


X-RAY EXAM OF KNEE 1 OR 2 (07/31/17)


X-RAY EXAM OF LOWER LEG (06/10/15)


X-RAY EXAM OF WRIST (10/27/16)








(1) S/p reverse total shoulder arthroplasty


SNOMED Code(s): 419930756, 695673409


   Code(s): Z96.619 - PRESENCE OF UNSPECIFIED ARTIFICIAL SHOULDER JOINT   

Priority: High   Current Visit: Yes   


Qualifiers: 


   Laterality: left   Qualified Code(s): Z96.612 - Presence of left artificial 

shoulder joint   





(2) Raynauds disease


SNOMED Code(s): 946441522


   Code(s): I73.00 - RAYNAUD'S SYNDROME WITHOUT GANGRENE   Priority: Low   

Current Visit: No   


Qualifiers: 


   Raynaud?s-associated gangrene presence: without gangrene   Qualified Code(s)

: I73.00 - Raynaud's syndrome without gangrene   





(3) Chronic lower back pain


SNOMED Code(s): 266256533


   Code(s): M54.5 - LOW BACK PAIN; G89.29 - OTHER CHRONIC PAIN   Priority: Low 

  Current Visit: No   


Qualifiers: 


   Back pain laterality: unspecified   Sciatica presence: unspecified whether 

sciatica present   Qualified Code(s): M54.5 - Low back pain; G89.29 - Other 

chronic pain   





(4) HTN (hypertension)


SNOMED Code(s): 23216136


   Code(s): I10 - ESSENTIAL (PRIMARY) HYPERTENSION   Priority: Low   Current 

Visit: No   


Qualifiers: 


   Hypertension type: essential hypertension   Qualified Code(s): I10 - 

Essential (primary) hypertension   





(5) Lupus


SNOMED Code(s): 98976487


   Code(s): M32.9 - SYSTEMIC LUPUS ERYTHEMATOSUS, UNSPECIFIED   Priority: 

Medium   Current Visit: No   


Qualifiers: 


   Systemic lupus erythematosus type: unspecified   Systemic lupus 

erythematosus organ involvement: unspecified   Qualified Code(s): M32.9 - 

Systemic lupus erythematosus, unspecified   





(6) Macular degeneration


SNOMED Code(s): 954027301


   Code(s): H35.30 - UNSPECIFIED MACULAR DEGENERATION   Priority: Medium   

Current Visit: No   





(7) Osteoarthritis


SNOMED Code(s): 752911509


   Code(s): M19.90 - UNSPECIFIED OSTEOARTHRITIS, UNSPECIFIED SITE   Priority: 

High   Current Visit: Yes   


Qualifiers: 


   Osteoarthritis location: shoulder   Osteoarthritis type: primary   Laterality

: left   Qualified Code(s): M19.012 - Primary osteoarthritis, left shoulder   





(8) Osteopenia


SNOMED Code(s): 094942134


   Code(s): M85.80 - OTH DISRD OF BONE DENSITY AND STRUCTURE, UNSPECIFIED SITE 

  Priority: Medium   Current Visit: No   


Qualifiers: 


   Osteopenia location: unspecified   Qualified Code(s): M85.80 - Other 

specified disorders of bone density and structure, unspecified site   





(9) Pancytopenia


SNOMED Code(s): 689468853


   Code(s): D61.818 - OTHER PANCYTOPENIA   Priority: Medium   Current Visit: No

   


Problem List Initiated/Reviewed/Updated: Yes


Plan: 


I/P:





Acute:





S/P left reverse total shoulder arthroplasty - post-operative day 1


   -DVT prophylaxis and pain management per primary care team


   -PT/OT 


   -IS/RT


   -Monitor oxygen saturation


   -Titrate oxygen as needed


   -Vital signs stable 


   -Monitor labs


      -Pre-operative Hgb was 13.9; Now 10.9


      -Pre-operative GFR was >90; Now >60


      -Pre-operative BUN was 16; Now 21


      -Pre-operative creatinine was 0.5; Now 0.8   


      -Pre-operative platelet count was 150; Now 137


      -Pre-operative sodium of 134; Now 135





Osteoarthritis of left shoulder


   -Pain management per primary care team





Chronic:





Pancytopenia


Discord lupus


Raynauds


Chronic lower back pain


microhematuria


osteopenia


HTN


Thrombocytopenia





Plan:


CM for discharge planning


GI prophylaxis 


Home medications as indicated


Other orders as listed above 


Routine AM labs





She is a full code. Her PCP is Dr. Ding





From a hospitalist standpoint Saray is doing well. Pain is controlled. She 

has been up ambulating and working with therapies. Labs and vital signs remain 

stable. She has urinated and is off of oxygen. She is cleared for discharge 

pending primary team and PT/OT agreement. 





Thank you for allowing us to participate in the care of this patient!! Anemia due to blood loss